# Patient Record
Sex: FEMALE | Race: BLACK OR AFRICAN AMERICAN | NOT HISPANIC OR LATINO | Employment: UNEMPLOYED | URBAN - METROPOLITAN AREA
[De-identification: names, ages, dates, MRNs, and addresses within clinical notes are randomized per-mention and may not be internally consistent; named-entity substitution may affect disease eponyms.]

---

## 2018-09-13 ENCOUNTER — OFFICE VISIT (OUTPATIENT)
Dept: OBSTETRICS AND GYNECOLOGY | Facility: CLINIC | Age: 28
End: 2018-09-13
Payer: MEDICAID

## 2018-09-13 VITALS
SYSTOLIC BLOOD PRESSURE: 112 MMHG | BODY MASS INDEX: 34.63 KG/M2 | WEIGHT: 183.44 LBS | DIASTOLIC BLOOD PRESSURE: 74 MMHG | HEIGHT: 61 IN

## 2018-09-13 DIAGNOSIS — N92.6 MISSED PERIOD: Primary | ICD-10-CM

## 2018-09-13 LAB
B-HCG UR QL: POSITIVE
CTP QC/QA: YES

## 2018-09-13 PROCEDURE — 81025 URINE PREGNANCY TEST: CPT | Mod: PBBFAC | Performed by: OBSTETRICS & GYNECOLOGY

## 2018-09-13 PROCEDURE — 99999 PR PBB SHADOW E&M-NEW PATIENT-LVL III: CPT | Mod: PBBFAC,,, | Performed by: OBSTETRICS & GYNECOLOGY

## 2018-09-13 PROCEDURE — 99203 OFFICE O/P NEW LOW 30 MIN: CPT | Mod: S$PBB,TH,, | Performed by: OBSTETRICS & GYNECOLOGY

## 2018-09-13 PROCEDURE — 99203 OFFICE O/P NEW LOW 30 MIN: CPT | Mod: PBBFAC | Performed by: OBSTETRICS & GYNECOLOGY

## 2018-09-20 ENCOUNTER — LAB VISIT (OUTPATIENT)
Dept: LAB | Facility: HOSPITAL | Age: 28
End: 2018-09-20
Attending: OBSTETRICS & GYNECOLOGY
Payer: MEDICAID

## 2018-09-20 ENCOUNTER — INITIAL PRENATAL (OUTPATIENT)
Dept: OBSTETRICS AND GYNECOLOGY | Facility: CLINIC | Age: 28
End: 2018-09-20
Payer: MEDICAID

## 2018-09-20 VITALS
DIASTOLIC BLOOD PRESSURE: 64 MMHG | BODY MASS INDEX: 35.28 KG/M2 | WEIGHT: 186.69 LBS | SYSTOLIC BLOOD PRESSURE: 110 MMHG

## 2018-09-20 DIAGNOSIS — O09.30 LATE PRENATAL CARE: ICD-10-CM

## 2018-09-20 DIAGNOSIS — Z34.90 CURRENT PREGNANCY WITH UNCERTAIN DATE OF LAST MENSTRUAL PERIOD WITH NORMAL PRIOR PREGNANCY: ICD-10-CM

## 2018-09-20 DIAGNOSIS — Z34.90 ENCOUNTER FOR SUPERVISION OF NORMAL PREGNANCY, ANTEPARTUM, UNSPECIFIED GRAVIDITY: Primary | ICD-10-CM

## 2018-09-20 DIAGNOSIS — Z34.90 ENCOUNTER FOR SUPERVISION OF NORMAL PREGNANCY, ANTEPARTUM, UNSPECIFIED GRAVIDITY: ICD-10-CM

## 2018-09-20 LAB
ABO + RH BLD: NORMAL
BASOPHILS # BLD AUTO: 0 K/UL
BASOPHILS NFR BLD: 0 %
BLD GP AB SCN CELLS X3 SERPL QL: NORMAL
CANDIDA RRNA VAG QL PROBE: NEGATIVE
DIFFERENTIAL METHOD: ABNORMAL
EOSINOPHIL # BLD AUTO: 0 K/UL
EOSINOPHIL NFR BLD: 0.2 %
ERYTHROCYTE [DISTWIDTH] IN BLOOD BY AUTOMATED COUNT: 13.9 %
G VAGINALIS RRNA GENITAL QL PROBE: NEGATIVE
HCT VFR BLD AUTO: 34.7 %
HGB BLD-MCNC: 11.5 G/DL
LYMPHOCYTES # BLD AUTO: 1.8 K/UL
LYMPHOCYTES NFR BLD: 20 %
MCH RBC QN AUTO: 27.6 PG
MCHC RBC AUTO-ENTMCNC: 33.1 G/DL
MCV RBC AUTO: 83 FL
MONOCYTES # BLD AUTO: 0.9 K/UL
MONOCYTES NFR BLD: 10.3 %
NEUTROPHILS # BLD AUTO: 6.1 K/UL
NEUTROPHILS NFR BLD: 69.5 %
PLATELET # BLD AUTO: 265 K/UL
PMV BLD AUTO: 11 FL
RBC # BLD AUTO: 4.17 M/UL
T VAGINALIS RRNA GENITAL QL PROBE: NEGATIVE
WBC # BLD AUTO: 8.84 K/UL

## 2018-09-20 PROCEDURE — 81511 FTL CGEN ABNOR FOUR ANAL: CPT

## 2018-09-20 PROCEDURE — 86762 RUBELLA ANTIBODY: CPT

## 2018-09-20 PROCEDURE — 86592 SYPHILIS TEST NON-TREP QUAL: CPT

## 2018-09-20 PROCEDURE — 86703 HIV-1/HIV-2 1 RESULT ANTBDY: CPT

## 2018-09-20 PROCEDURE — 99214 OFFICE O/P EST MOD 30 MIN: CPT | Mod: TH,S$PBB,25, | Performed by: OBSTETRICS & GYNECOLOGY

## 2018-09-20 PROCEDURE — 88142 CYTOPATH C/V THIN LAYER: CPT | Performed by: PATHOLOGY

## 2018-09-20 PROCEDURE — 88141 CYTOPATH C/V INTERPRET: CPT | Mod: ,,, | Performed by: PATHOLOGY

## 2018-09-20 PROCEDURE — 87660 TRICHOMONAS VAGIN DIR PROBE: CPT

## 2018-09-20 PROCEDURE — 99999 PR PBB SHADOW E&M-EST. PATIENT-LVL III: CPT | Mod: PBBFAC,,, | Performed by: OBSTETRICS & GYNECOLOGY

## 2018-09-20 PROCEDURE — 99213 OFFICE O/P EST LOW 20 MIN: CPT | Mod: PBBFAC,TH,25 | Performed by: OBSTETRICS & GYNECOLOGY

## 2018-09-20 PROCEDURE — 83021 HEMOGLOBIN CHROMOTOGRAPHY: CPT

## 2018-09-20 PROCEDURE — 86850 RBC ANTIBODY SCREEN: CPT

## 2018-09-20 PROCEDURE — 87340 HEPATITIS B SURFACE AG IA: CPT

## 2018-09-20 PROCEDURE — 36415 COLL VENOUS BLD VENIPUNCTURE: CPT

## 2018-09-20 PROCEDURE — 76805 OB US >/= 14 WKS SNGL FETUS: CPT | Mod: 26,S$PBB,, | Performed by: OBSTETRICS & GYNECOLOGY

## 2018-09-20 PROCEDURE — 87086 URINE CULTURE/COLONY COUNT: CPT

## 2018-09-20 PROCEDURE — 85025 COMPLETE CBC W/AUTO DIFF WBC: CPT

## 2018-09-20 PROCEDURE — 76805 OB US >/= 14 WKS SNGL FETUS: CPT | Mod: PBBFAC | Performed by: OBSTETRICS & GYNECOLOGY

## 2018-09-20 PROCEDURE — 87491 CHLMYD TRACH DNA AMP PROBE: CPT

## 2018-09-20 NOTE — PATIENT INSTRUCTIONS
Adapting to Pregnancy: Second Trimester  Keep up the healthy habits you started in your first trimester. You might be a little more tired than normal. So plan your day wisely. Look at the tips below and choose the ones that suit your lifestyle.    If you have any questions, check with your healthcare provider.   If you work  If you can, adjust your work with your employer to fit your needs. Try these tips:  · If you stand for long periods, find ways to do some tasks while sitting. Also, try to stand with 1 foot resting on a low stool or ledge. Shift your weight from foot to foot often. Wear low-heeled shoes.  · If you sit, keep your knees level with your hips. Rest your feet on a firm surface. Sit tall with support for your low back.  · If you work long hours, ask about adjusting your schedule. Try taking shorter breaks more often.  When you travel  The second trimester may be the best time for any travel. Talk to your healthcare provider about any special plans you may need to make. Always:  · Wear a seat belt. Fasten the lap part under your belly. Wear the shoulder part also.  · Take breaks often during long trips by car or plane. Move around to stretch your legs.  · Drink plenty of fluids on flights. The air in plane cabins is very dry.  · Avoid hot climates or high altitudes if you are not used to them.  · Avoid places where the food and water might make you sick.  · Make sure you are up-to-date on all immunizations, including the flu vaccine. This is especially important when traveling overseas.  Taking time to relax  Find time to rest and relax at work or at home:  · Take short time-outs daily. Do relaxation exercises.  · Breathe deeply during stressful times.  · Try not to take on too much. Plan tasks for times when you have the most energy.  · Take naps when you can. Or just sit and relax.  · After week 16, avoid lying on your back for more than a few minutes. Instead, lie on your side. Switch sides  often.  Continuing as lovers  Unless your healthcare provider tells you otherwise, there is no reason to stop having sex now. Blood supply increases to the pelvic area in the second trimester. Because of this, sex might be more enjoyable. Try different positions and see whats best. Also, talk to your partner about any changes in desire. Spotting may happen after sex. Be sure to let your healthcare provider know if there is heavy bleeding.  Keeping your environment safe  You can still clean house and use scented products. Just take some simple precautions:  · Wear gloves when using cleaning fluids.  · Open windows to let in fresh air. Use a fan if you paint.  · Avoid secondhand smoke.  · Dont breathe fumes from nail polish, hair spray, cleansers, or other chemicals.  Date Last Reviewed: 8/16/2015  © 6556-5966 [email or web address] [address] [place] [address], [place] [address]. All rights reserved. This information is not intended as a substitute for professional medical care. Always follow your healthcare professional's instructions.

## 2018-09-20 NOTE — PROGRESS NOTES
Complaints today: Ms. Pendleton denies complaints this visit. She reports that she is not experiencing nausea or vomiting. She is able to tolerate a regular diet. She has notices an increase in normal discharge but not foul odor or changes to discharge. Patient reports that she was told by the [facility] office that she was approximately 15weeks but by the LMP she has given of 2018 which she is unsure about her SHARON is 2019 and she would be 12w0d today    /64   Wt 84.7 kg (186 lb 11.2 oz)   LMP 2018   BMI 35.28 kg/m²     Patient Active Problem List   Diagnosis    Encounter for supervision of normal pregnancy, antepartum    Late prenatal care     OB History    Para Term  AB Living   4 3 3     3   SAB TAB Ectopic Multiple Live Births           1      # Outcome Date GA Lbr Jules/2nd Weight Sex Delivery Anes PTL Lv   4 Current            3 Term 02/15/15 40w0d   F Vag-Spont  N    2 Term 12 40w0d   F Vag-Spont  N JULY   1 Term 11/01/10 37w0d   M Vag-Spont             Dating reviewed: Unofficial Abdominal US done with SHARON 2019 but will not change dating until official [facility] US is done    Allergies and problem list reviewed and updated    Medical and surgical history reviewed    Prenatal labs drawn today    Review of Systems - General ROS: negative for - chills, fever or sleep disturbance  Psychological ROS: negative for - anxiety or depression  Breast ROS: negative for - nipple discharge or breast lumps  Respiratory ROS: no cough, shortness of breath, or wheezing  Cardiovascular ROS: no chest pain or dyspnea on exertion  Gastrointestinal ROS: no abdominal pain, change in bowel habits, or black or bloody stools  Genito-Urinary ROS: no dysuria, trouble voiding, or hematuria  Musculoskeletal ROS: negative for - muscle pain or muscular weakness      Physical Examination: General appearance - alert, well appearing, and in no distress and oriented to person, place, and time  Chest - clear  to auscultation, no wheezes, rales or rhonchi, symmetric air entry  Heart - normal rate, regular rhythm, normal S1, S2, no murmurs, rubs, clicks or gallops  Abdomen - soft, non-tender but gravid  Pelvic - Cervix normal and visually closed, no adnexa fullness, uterus approximately 15 week size  Extremities - peripheral pulses normal, no pedal edema, no clubbing or cyanosis  Skin - normal coloration and turgor, no rashes, no suspicious skin lesions noted        Assessment/Plan:  Anila was seen today for routine prenatal visit.    Diagnoses and all orders for this visit:    Encounter for supervision of normal pregnancy, antepartum, unspecified   -     HIV-1 and HIV-2 antibodies; Future  -     RPR; Future  -     Hemoglobin Electrophoresis,Hgb A2 Blake.; Future  -     Liquid-based pap smear, screening  -     Hepatitis B surface antigen; Future  -     Type & Screen - Ob Profile; Future  -     Rubella antibody, IgG; Future  -     Urine culture  -     C. trachomatis/N. gonorrhoeae by AMP DNA  -     Vaginosis Screen by DNA Probe  -     Cancel: US MFM Procedure ([facility]); Future  -     CBC auto differential; Future  -     US OB/GYN Procedure ([facility])  -     Maternal Quad Screen; Future  -     US MFM Procedure ([facility]); Future    Late prenatal care    Current pregnancy with uncertain date of last menstrual period with normal prior pregnancy  - SHARON by LMP given 2019  - On first trimester dating scan, fetus clearly larger than first trimester  - Quick measurements revealed SHARON 2019 AND 16w4d  - Will have MFM US done to have official dating      Orders Placed This Encounter   Procedures    Urine culture    C. trachomatis/N. gonorrhoeae by AMP DNA    Vaginosis Screen by DNA Probe    HIV-1 and HIV-2 antibodies    RPR    Hemoglobin Electrophoresis,Hgb A2 Blake.    Hepatitis B surface antigen    Rubella antibody, IgG    CBC auto differential    Maternal Quad Screen    Type & Screen - Ob Profile     US OB/GYN Procedure ([facility])    US MFM Procedure ([facility])       Follow-up in about 4 weeks (around 10/18/2018) for routine OB.

## 2018-09-21 ENCOUNTER — TELEPHONE (OUTPATIENT)
Dept: OBSTETRICS AND GYNECOLOGY | Facility: CLINIC | Age: 28
End: 2018-09-21

## 2018-09-21 LAB
C TRACH DNA SPEC QL NAA+PROBE: NOT DETECTED
HBV SURFACE AG SERPL QL IA: NEGATIVE
HGB A2 MFR BLD HPLC: 2.9 %
HGB FRACT BLD ELPH-IMP: NORMAL
HGB FRACT BLD ELPH-IMP: NORMAL
HIV 1+2 AB+HIV1 P24 AG SERPL QL IA: NEGATIVE
N GONORRHOEA DNA SPEC QL NAA+PROBE: NOT DETECTED
RPR SER QL: NORMAL
RUBV IGG SER-ACNC: 88.3 IU/ML
RUBV IGG SER-IMP: REACTIVE

## 2018-09-21 NOTE — TELEPHONE ENCOUNTER
----- Message from Tanya Nichols sent at 9/21/2018  9:05 AM CDT -----  Contact: [facility] The prenatal testing area of [facility] called to obtain a due date for the patient. They can be reached at [phone]. Please call at your earliest convenience.   -----------------------------------------------------  9/21/18 @ 1142 (DRE)   SPOKE WITH KEYUR @ [facility]  SHE REQUESTED THE PT'S DUE DATE OF 4/4/19 FOR PT'S LABS

## 2018-09-22 LAB — BACTERIA UR CULT: NORMAL

## 2018-10-05 ENCOUNTER — PROCEDURE VISIT (OUTPATIENT)
Dept: OBSTETRICS AND GYNECOLOGY | Facility: CLINIC | Age: 28
End: 2018-10-05
Payer: MEDICAID

## 2018-10-05 VITALS
BODY MASS INDEX: 35.4 KG/M2 | WEIGHT: 187.5 LBS | SYSTOLIC BLOOD PRESSURE: 108 MMHG | HEIGHT: 61 IN | DIASTOLIC BLOOD PRESSURE: 70 MMHG

## 2018-10-05 DIAGNOSIS — R87.613 HSIL (HIGH GRADE SQUAMOUS INTRAEPITHELIAL LESION) ON PAP SMEAR OF CERVIX: Primary | ICD-10-CM

## 2018-10-05 PROCEDURE — 88305 TISSUE EXAM BY PATHOLOGIST: CPT | Performed by: PATHOLOGY

## 2018-10-05 PROCEDURE — 88305 TISSUE EXAM BY PATHOLOGIST: CPT | Mod: 26,,, | Performed by: PATHOLOGY

## 2018-10-05 PROCEDURE — 57455 BIOPSY OF CERVIX W/SCOPE: CPT | Mod: PBBFAC | Performed by: OBSTETRICS & GYNECOLOGY

## 2018-10-05 PROCEDURE — 57455 BIOPSY OF CERVIX W/SCOPE: CPT | Mod: S$PBB,,, | Performed by: OBSTETRICS & GYNECOLOGY

## 2018-10-05 NOTE — PROCEDURES
Colposcopy  Date/Time: 10/5/2018 3:10 PM  Performed by: Shiva Arenas MD  Authorized by: Shiva Arenas MD     Consent Done?:  Yes (Written)  Assistants?: No      Colposcopy Site:  Cervix  Position:  Supine   Patient was prepped and draped in the normal sterile fashion.  Acrowhite Lesion? Yes    Atypical Vessels: No    Transformation Zone Adequate?: Yes    Biopsy?: Yes         Location:  Cervix ((3 00, 10 00 and 6 00))  ECC Performed?: No    LEEP Performed?: No    Estimated blood loss (cc):  5   Patient tolerated the procedure well with no immediate complications.   Post-operative instructions were provided for the patient.   Patient was discharged and will follow up if any complications occur     COLPOSCOPY:    Anila Pendleton is a 27 y.o. female   presents for colposcopy.  Patient's last menstrual period was 2018. 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      The abnormal test findings were discussed, as well as HPV infection, need for colposcopy and possible biopsies to determine the plan of care, treatments available, the minimal risk of bleeding and infection with colposcopy, and alternatives to colposcopy and she agrees to proceed.      UPT is negative    COLPOSCOPY EXAM:   TIME OUT PERFORMED.     acetowhite lesion(s) noted at 3, 6, and 10 o'clock    Biopsy was taken at 3, 6, and 10 o clock.  ECC was not performed    Hemostasis was adequate with application of Monsel's solution.  The speculum was removed.  The patient did tolerate the procedure well.    All collected specimens sent to pathology for histologic analysis.    Post-colposcopy counseling:  The patient was instructed to manage post-colposcopy cramping with NSAIDs or Tylenol, or with a prescription per the medication card.  Avoid intercourse, douching, or tampons in the vagina for at least 2-3 days.  Expect a clumpy blackish  discharge due to Monsel's solution application for several days.  Report heavy bleeding, worsening pain or pain that does not respond to above medications, or foul-smelling vaginal discharge. HPV vaccine recommended according to FDA age guidelines.  Importance of follow-up stressed.      Follow up based on colposcopy results.

## 2018-10-18 ENCOUNTER — ROUTINE PRENATAL (OUTPATIENT)
Dept: OBSTETRICS AND GYNECOLOGY | Facility: CLINIC | Age: 28
End: 2018-10-18
Payer: MEDICAID

## 2018-10-18 VITALS
DIASTOLIC BLOOD PRESSURE: 74 MMHG | WEIGHT: 189.38 LBS | BODY MASS INDEX: 35.79 KG/M2 | SYSTOLIC BLOOD PRESSURE: 112 MMHG

## 2018-10-18 DIAGNOSIS — O09.30 LATE PRENATAL CARE: Primary | ICD-10-CM

## 2018-10-18 DIAGNOSIS — Z34.90 ENCOUNTER FOR SUPERVISION OF NORMAL PREGNANCY, ANTEPARTUM, UNSPECIFIED GRAVIDITY: ICD-10-CM

## 2018-10-18 DIAGNOSIS — N87.1 DYSPLASIA OF CERVIX, HIGH GRADE CIN 2: ICD-10-CM

## 2018-10-18 PROCEDURE — 99213 OFFICE O/P EST LOW 20 MIN: CPT | Mod: TH,S$PBB,, | Performed by: OBSTETRICS & GYNECOLOGY

## 2018-10-18 PROCEDURE — 99212 OFFICE O/P EST SF 10 MIN: CPT | Mod: PBBFAC,TH | Performed by: OBSTETRICS & GYNECOLOGY

## 2018-10-18 PROCEDURE — 99999 PR PBB SHADOW E&M-EST. PATIENT-LVL II: CPT | Mod: PBBFAC,,, | Performed by: OBSTETRICS & GYNECOLOGY

## 2018-10-18 NOTE — PROGRESS NOTES
Complaints today: Helder  is doing well with no complaints. Normal fetal movements with no vaginal bleeding, LOF or contractions at this time.       Wt 85.9 kg (189 lb 6.4 oz)   LMP 2018   BMI 35.79 kg/m²     27 y.o., at 16w0d by Estimated Date of Delivery: 19  Patient Active Problem List   Diagnosis    Encounter for supervision of normal pregnancy, antepartum    Late prenatal care    HSIL (high grade squamous intraepithelial lesion) on Pap smear of cervix     OB History    Para Term  AB Living   4 3 3     3   SAB TAB Ectopic Multiple Live Births           1      # Outcome Date GA Lbr Jules/2nd Weight Sex Delivery Anes PTL Lv   4 Current            3 Term 02/15/15 40w0d   F Vag-Spont  N    2 Term 12 40w0d   F Vag-Spont  N JULY   1 Term 11/01/10 37w0d   M Vag-Spont             Dating reviewed    Allergies and problem list reviewed and updated    Medical and surgical history reviewed    Prenatal labs reviewed and updated    Physical Exam:  ABD: soft, gravid, nontender, 18 week size gravid uterus    Assessment:   at approimately 16w0d by approximate LMP doing well    Plan:   1. MFM Dating US and Anatomy scan 10/22g  2. S/p colposcopy with one CIN2 lesion; will repeat pap smear 6 weeks postpartum  3. Abnormal Quad screen; likely due to incorrect dating  4. Follow up 4 Weeks, bleeding/pain precautions given

## 2018-10-18 NOTE — PATIENT INSTRUCTIONS
Adapting to Pregnancy: Second Trimester  Keep up the healthy habits you started in your first trimester. You might be a little more tired than normal. So plan your day wisely. Look at the tips below and choose the ones that suit your lifestyle.    If you have any questions, check with your healthcare provider.   If you work  If you can, adjust your work with your employer to fit your needs. Try these tips:  · If you stand for long periods, find ways to do some tasks while sitting. Also, try to stand with 1 foot resting on a low stool or ledge. Shift your weight from foot to foot often. Wear low-heeled shoes.  · If you sit, keep your knees level with your hips. Rest your feet on a firm surface. Sit tall with support for your low back.  · If you work long hours, ask about adjusting your schedule. Try taking shorter breaks more often.  When you travel  The second trimester may be the best time for any travel. Talk to your healthcare provider about any special plans you may need to make. Always:  · Wear a seat belt. Fasten the lap part under your belly. Wear the shoulder part also.  · Take breaks often during long trips by car or plane. Move around to stretch your legs.  · Drink plenty of fluids on flights. The air in plane cabins is very dry.  · Avoid hot climates or high altitudes if you are not used to them.  · Avoid places where the food and water might make you sick.  · Make sure you are up-to-date on all immunizations, including the flu vaccine. This is especially important when traveling overseas.  Taking time to relax  Find time to rest and relax at work or at home:  · Take short time-outs daily. Do relaxation exercises.  · Breathe deeply during stressful times.  · Try not to take on too much. Plan tasks for times when you have the most energy.  · Take naps when you can. Or just sit and relax.  · After week 16, avoid lying on your back for more than a few minutes. Instead, lie on your side. Switch sides  often.  Continuing as lovers  Unless your healthcare provider tells you otherwise, there is no reason to stop having sex now. Blood supply increases to the pelvic area in the second trimester. Because of this, sex might be more enjoyable. Try different positions and see whats best. Also, talk to your partner about any changes in desire. Spotting may happen after sex. Be sure to let your healthcare provider know if there is heavy bleeding.  Keeping your environment safe  You can still clean house and use scented products. Just take some simple precautions:  · Wear gloves when using cleaning fluids.  · Open windows to let in fresh air. Use a fan if you paint.  · Avoid secondhand smoke.  · Dont breathe fumes from nail polish, hair spray, cleansers, or other chemicals.  Date Last Reviewed: 8/16/2015  © 9110-4283 [employer]. [address] [place] [address], [place] [address]. All rights reserved. This information is not intended as a substitute for professional medical care. Always follow your healthcare professional's instructions.

## 2018-10-20 ENCOUNTER — HOSPITAL ENCOUNTER (EMERGENCY)
Facility: HOSPITAL | Age: 28
Discharge: HOME | End: 2018-10-20
Attending: EMERGENCY MEDICINE
Payer: MEDICAID

## 2018-10-20 VITALS
TEMPERATURE: 98 F | SYSTOLIC BLOOD PRESSURE: 113 MMHG | OXYGEN SATURATION: 99 % | RESPIRATION RATE: 18 BRPM | BODY MASS INDEX: 35.9 KG/M2 | HEART RATE: 90 BPM | WEIGHT: 190 LBS | DIASTOLIC BLOOD PRESSURE: 63 MMHG

## 2018-10-20 DIAGNOSIS — L03.90 CELLULITIS, UNSPECIFIED CELLULITIS SITE: ICD-10-CM

## 2018-10-20 DIAGNOSIS — L02.91 ABSCESS: Primary | ICD-10-CM

## 2018-10-20 LAB
B-HCG UR QL: POSITIVE
CTP QC/QA: YES

## 2018-10-20 PROCEDURE — 25000003 PHARM REV CODE 250: Performed by: EMERGENCY MEDICINE

## 2018-10-20 PROCEDURE — 10060 I&D ABSCESS SIMPLE/SINGLE: CPT

## 2018-10-20 PROCEDURE — 81025 URINE PREGNANCY TEST: CPT | Performed by: EMERGENCY MEDICINE

## 2018-10-20 PROCEDURE — 99284 EMERGENCY DEPT VISIT MOD MDM: CPT | Mod: 25

## 2018-10-20 RX ORDER — MUPIROCIN 20 MG/G
OINTMENT TOPICAL 3 TIMES DAILY
Qty: 1 TUBE | Refills: 0 | Status: SHIPPED | OUTPATIENT
Start: 2018-10-20 | End: 2018-10-30

## 2018-10-20 RX ORDER — MUPIROCIN 20 MG/G
OINTMENT TOPICAL
Status: COMPLETED | OUTPATIENT
Start: 2018-10-20 | End: 2018-10-20

## 2018-10-20 RX ORDER — ACETAMINOPHEN 500 MG
1000 TABLET ORAL EVERY 6 HOURS PRN
Qty: 30 TABLET | Refills: 0 | Status: SHIPPED | OUTPATIENT
Start: 2018-10-20 | End: 2018-11-15

## 2018-10-20 RX ORDER — CLINDAMYCIN HYDROCHLORIDE 150 MG/1
300 CAPSULE ORAL 4 TIMES DAILY
Qty: 80 CAPSULE | Refills: 0 | Status: SHIPPED | OUTPATIENT
Start: 2018-10-20 | End: 2018-10-30

## 2018-10-20 RX ADMIN — MUPIROCIN: 20 OINTMENT TOPICAL at 01:10

## 2018-10-20 NOTE — ED PROVIDER NOTES
"Encounter Date: 10/20/2018    SCRIBE #1 NOTE: I, Mckenna Pendleton, am scribing for, and in the presence of,  Dr. Vasquez. I have scribed the following portions of the note - Other sections scribed: HPI, ROS, PE.       History     Chief Complaint   Patient presents with    Rash     Pt states," I have a rash on my left ankle for a few days and it itches. It has my ankle swollen."     27 y.o female who is 20 weeks gestation presents with a rash on her left ankle for 1 week that has worsened today. She notes swelling today. She also says her bed sheets irritate the area. She is unsure how she got the rash, but she says "I scratched my ankle and had bumps ever since". She thinks the cause of the rash is from a mosquito bite.       The history is provided by the patient.     Review of patient's allergies indicates:  No Known Allergies  Past Medical History:   Diagnosis Date    Anemia      Past Surgical History:   Procedure Laterality Date    COLPOSCOPY       Family History   Problem Relation Age of Onset    Cancer Maternal Grandmother     Diabetes Maternal Uncle      Social History     Tobacco Use    Smoking status: Never Smoker    Smokeless tobacco: Never Used   Substance Use Topics    Alcohol use: No     Frequency: Never    Drug use: No     Review of Systems   Constitutional: Negative for chills and fever.   Respiratory: Negative for shortness of breath.    Cardiovascular: Negative for chest pain.   Gastrointestinal: Negative for abdominal pain.   Genitourinary: Negative for vaginal bleeding.   Skin: Positive for color change and rash. Negative for wound.   All other systems reviewed and are negative.      Physical Exam     Initial Vitals [10/20/18 1243]   BP Pulse Resp Temp SpO2   108/75 100 16 98.1 °F (36.7 °C) 99 %      MAP       --         Patient gave consent to have physical exam performed.  Physical Exam    Nursing note and vitals reviewed.  Constitutional: She appears well-developed and well-nourished. She " is not diaphoretic. No distress.   HENT:   Head: Normocephalic and atraumatic.   Right Ear: External ear normal.   Left Ear: External ear normal.   Nose: Nose normal.   Eyes: Conjunctivae and EOM are normal. Pupils are equal, round, and reactive to light.   Neck: Normal range of motion. Neck supple.   Cardiovascular: Normal rate, regular rhythm and normal heart sounds. Exam reveals no gallop and no friction rub.    No murmur heard.  Pulmonary/Chest: Breath sounds normal. No respiratory distress. She has no wheezes. She has no rhonchi. She has no rales. She exhibits no tenderness.   Abdominal: Soft. Bowel sounds are normal. She exhibits no distension and no mass. There is no tenderness. There is no rebound.   Musculoskeletal: Normal range of motion.   Neurological: She is alert and oriented to person, place, and time. No cranial nerve deficit or sensory deficit.   Skin: Skin is warm, dry and intact. Capillary refill takes less than 2 seconds. Lesion and abscess noted. There is erythema.              ED Course   I & D - Incision and Drainage  Date/Time: 10/20/2018 2:27 PM  Performed by: Zoey Vasquez DO  Authorized by: Zoey Vasquez DO   Consent Done: Yes  Consent: Verbal consent obtained.  Risks and benefits: risks, benefits and alternatives were discussed  Consent given by: patient  Patient understanding: patient states understanding of the procedure being performed  Patient consent: the patient's understanding of the procedure matches consent given  Procedure consent: procedure consent matches procedure scheduled  Type: abscess  Anesthesia: local infiltration    Anesthesia:  Local Anesthetic: lidocaine 2% without epinephrine  Anesthetic total: 2 mL  Patient sedated: no  Scalpel size: 11  Incision type: single straight  Complexity: simple  Drainage: pus  Drainage amount: scant  Wound treatment: incision and  drainage  Packing material: none  Complications: No  Specimens: No  Implants: No  Patient tolerance: Patient  tolerated the procedure well with no immediate complications  Comments: Superficial area of fluctuance.  I unroofed abscess scant amount pus removed.  Patient tolerated well.  Antibiotic ointment applied and bandage applied.        Labs Reviewed   POCT URINE PREGNANCY - Abnormal; Notable for the following components:       Result Value    POC Preg Test, Ur Positive (*)     All other components within normal limits               Medical Decision Making:   Clinical Tests:   Lab Tests: Ordered and Reviewed    Treatment in the ED Physical Exam, incision and drainage.  Patient reports feeling much better after treatment in the emergency room.    Discussed outpatient treatment plan and follow-up    Fill and take prescriptions as directed.  Return to the ED if symptoms worsen or do not resolve.   Answered questions and discussed discharge plan.    Patient feels much better and is ready for discharge.  Follow up with PCP in 1 days.            Scribe Attestation:   Scribe #1: I performed the above scribed service and the documentation accurately describes the services I performed. I attest to the accuracy of the note.     I, Dr. Zoey Vasquez, personally performed the services described in this documentation. This document was produced by a scribe under my direction and in my presence. All medical record entries made by the scribe were at my direction and in my presence.  I have reviewed the chart and agree that the record reflects my personal performance and is accurate and complete. Zoey Vasquez DO.     10/20/2018 2:29 PM             Clinical Impression:     1. Abscess    2. Cellulitis, unspecified cellulitis site                               Zoey Vasquez DO  10/20/18 5638

## 2018-10-22 ENCOUNTER — HOSPITAL ENCOUNTER (OUTPATIENT)
Dept: PERINATAL CARE | Facility: HOSPITAL | Age: 28
Discharge: HOME | End: 2018-10-22
Attending: OBSTETRICS & GYNECOLOGY
Payer: MEDICAID

## 2018-10-22 DIAGNOSIS — Z34.90 ENCOUNTER FOR SUPERVISION OF NORMAL PREGNANCY, ANTEPARTUM, UNSPECIFIED GRAVIDITY: ICD-10-CM

## 2018-10-22 DIAGNOSIS — Z36.89 ENCOUNTER FOR FETAL ANATOMIC SURVEY: ICD-10-CM

## 2018-10-22 DIAGNOSIS — O28.0 ABNORMAL MATERNAL SERUM SCREENING TEST: Primary | ICD-10-CM

## 2018-10-22 PROCEDURE — 76805 OB US >/= 14 WKS SNGL FETUS: CPT

## 2018-10-22 PROCEDURE — 76805 OB US >/= 14 WKS SNGL FETUS: CPT | Mod: 26,,, | Performed by: OBSTETRICS & GYNECOLOGY

## 2018-10-22 NOTE — ADDENDUM NOTE
Encounter addended by: Yazmin Ocampo RN on: 10/22/2018 10:09 AM   Actions taken: Charge Capture section accepted

## 2018-10-22 NOTE — PROGRESS NOTES
Indication  ========    Fetal anatomy survey.    History  ======    General History  Blood group: O  Rhesus: Rh positive  Smoking: No  Height 155 cm  Height (ft) 5 ft  Height (in) 1 in  Medical History  Allergies: No allergies identified  Past medical history: No previous diseases identified  Past surgical history: No previous surgeries performed  Previous Outcomes  Preg. no. 1  Outcome: Live YOB: 2012  Gest. age 37 w + 0 d  Gender: male  Preg. no. 2  Outcome: Live YOB: 2012  Gest. age 40 w + 0 d  Gender: female  Preg. no. 3  Outcome: Live YOB: 2015  Gest. age 40 w + 0 d  Gender: female   4  Para 3  Ambrocio children born living (T) 3  Ambrocio children born (T) 3  Abortions (A) 0  Ambrocio living children (L) 3    Pregnancy History  ==============    Maternal Lab Tests  Test: quad screen  Result:  Uninterpretable    Wants to know gender: yes    Maternal Assessment  =================    Height 155 cm  Height (ft) 5 ft  Height (in) 1 in  BP syst 119 mmHg  BP diast 64 mmHg    Method  ======    2D Color Doppler, , [place]on E6, Transabdominal ultrasound examination. View: Good view.    Pregnancy  =========    Ambrocio pregnancy. Number of fetuses: 1.    Dating  ======    LMP on: 2018  Cycle: regular cycle  GA by LMP 16 w + 4 d  SHARON by LMP: 2019  Ultrasound examination on: 10/22/2018  GA by U/S based upon: AC, BPD, Femur, HC  GA by U/S 20 w + 6 d  SHARON by U/S: 3/5/2019  Assigned: Dating performed on 10/22/2018, based on ultrasound (AC, BPD, Femur, HC)  Assigned GA 20 w + 6 d  Assigned SHARON: 3/5/2019    General Evaluation  ==============    Cardiac activity: present.  bpm.  Fetal movements: visualized.  Presentation: cephalic.  Placenta: posterior, fundal.  Umbilical cord: placental insertion: normal, 3 vessel cord, normal.  Amniotic fluid: normal amount.    Fetal Biometry  ============    Fetal Biometry  BPD 47.5 mm 20w 3d Hadlock  OFD 64.4 mm 21w 6d  Kadie  .6 mm 20w 4d Hadlock  .3 mm 21w 6d Hadlock  Femur 33.6 mm 20w 4d Hadlock  Cerebellum tr 5.4 mm  CM 20.8 mm  Humerus 32.2 mm 20w 6d Kadie   g 39% Juan C  Calculated by: Hadlock (BPD-HC-AC-FL)  EFW (lb) 0 lb  EFW (oz) 14 oz  Cephalic index 0.74  HC / AC 1.08  FL / BPD 0.71  FL / AC 0.20   bpm  Head / Face / Neck   5.3 mm    Fetal Anatomy  ============    Cranium: normal  Lateral ventricles: normal  Choroid plexus: normal  Midline falx: normal  Cavum septi pellucidi: normal  Cerebellum: normal  Cisterna magna: normal  Head shape: normal  Rt lateral ventricle: normal  Lt lateral ventricle: normal  Rt choroid plexus: normal  Lt choroid plexus: normal  Parenchyma: normal  Third ventricle: normal  Posterior fossa: normal  Cerebellar lobes: normal  Vermis: normal  Neck: appears normal  Nuchal fold: normal  Lips: normal  Profile: normal  Nose: normal  Maxilla: normal  Mandible: normal  4-chamber view: normal  RVOT: normal  LVOT: normal  Heart / Thorax: Septal views: normal  Situs: normal  Aortic arch: normal  Ductal arch: normal  SVC: normal  IVC: normal  3-vessel view: normal  3-vessel-trachea view: normal  Cardiac position: normal  Cardiac axis: normal  Cardiac size: normal  Cardiac rhythm: normal  Rt lung: normal  Lt lung: normal  Diaphragm: normal  Cord insertion: normal  Stomach: normal  Kidneys: normal  Bladder: normal  Abdom. wall: appears normal  Abdom. cavity: normal  Rt kidney: normal  Lt kidney: normal  Liver: normal  Bowel: normal  Cervical spine: normal  Thoracic spine: normal  Lumbar spine: normal  Sacral spine: normal  Arms: normal  Legs: normal  Rt arm: normal  Lt arm: normal  Rt hand: present  Lt hand: present  Rt leg: normal  Lt leg: normal  Rt foot: normal  Lt foot: normal  Position of hands: normal  Position of feet: normal  Gender: female  Wants to know gender: yes    Maternal Structures  ===============    Uterus / Cervix  Uterus: Normal  Cervical length 39.7  mm  Ovaries / Tubes / Adnexa  Rt ovary: Visualized  Lt ovary: Visualized  Other: Uterus and adnexa normal    Impression  =========    Ambrocio live intrauterine pregnancy.  Based on today's dating, the patient is 20 and 6 weeks with SHARON of 3/5/19.  Normal amniotic fluid volume by qualitative assessment.  The visualized fetal anatomy appears normal.  Placenta is posterior, fundal without previa.  Transabdominal cervical length is normal.    Patient for ultrasound only. Patient informed of ultrasound findings.    Recommendation  ==============    Follow up ultrasound as clinically indicated.  Patient for ultrasound only. I spoke to Dr. Arenas regarding SHARON. She is ordering quad screen for the patient as first was  uninterpretable due to need for SHARON. Patient advised to go to lab per primary ob request. Aware of time limitations of this study.    (patient had ultrasound done 9/20/18 by primary ob which did not agree with LMP dating but primary desires to use our ultrasound for dating)

## 2018-10-31 ENCOUNTER — HOSPITAL ENCOUNTER (EMERGENCY)
Facility: HOSPITAL | Age: 28
Discharge: HOME | End: 2018-10-31
Attending: EMERGENCY MEDICINE
Payer: MEDICAID

## 2018-10-31 VITALS
BODY MASS INDEX: 35.87 KG/M2 | DIASTOLIC BLOOD PRESSURE: 70 MMHG | TEMPERATURE: 98 F | HEART RATE: 89 BPM | HEIGHT: 61 IN | WEIGHT: 190 LBS | OXYGEN SATURATION: 100 % | RESPIRATION RATE: 16 BRPM | SYSTOLIC BLOOD PRESSURE: 110 MMHG

## 2018-10-31 DIAGNOSIS — K02.9 PAIN DUE TO DENTAL CARIES: Primary | ICD-10-CM

## 2018-10-31 LAB
BILIRUBIN, POC UA: NEGATIVE
BLOOD, POC UA: NEGATIVE
CLARITY, POC UA: ABNORMAL
COLOR, POC UA: YELLOW
GLUCOSE, POC UA: NEGATIVE
KETONES, POC UA: NEGATIVE
LEUKOCYTE EST, POC UA: NEGATIVE
NITRITE, POC UA: NEGATIVE
PH UR STRIP: 6 [PH]
PROTEIN, POC UA: NEGATIVE
SPECIFIC GRAVITY, POC UA: >=1.03
UROBILINOGEN, POC UA: 0.2 E.U./DL

## 2018-10-31 PROCEDURE — 99282 EMERGENCY DEPT VISIT SF MDM: CPT

## 2018-10-31 PROCEDURE — 81003 URINALYSIS AUTO W/O SCOPE: CPT

## 2018-10-31 RX ORDER — CHLORHEXIDINE GLUCONATE ORAL RINSE 1.2 MG/ML
15 SOLUTION DENTAL 2 TIMES DAILY
Qty: 118 ML | Refills: 0 | Status: SHIPPED | OUTPATIENT
Start: 2018-10-31 | End: 2018-11-15

## 2018-10-31 NOTE — ED PROVIDER NOTES
Encounter Date: 10/31/2018       History     Chief Complaint   Patient presents with    Dental Pain     PT c/o left upper dental pain x one week. Pt reports she cracked her tooth. Pt currently on Clindamycin for infection to foot. Pt reports she has not been to a dentist because she didn't think they could do anything while she is pregnant. Pt is approx. 22 weeks gestation. Denies any problems with pregnancy. Resp even and non labored. NAD noted.      27 y.o. Female  @ 17 WGA by LMP 18  Patient states she has been taking Clindamycin and ibuprofen and has also been using Orajel for symptoms.        Dental Pain   The primary symptoms include dental injury. Primary symptoms do not include fever. Illness onset: 1 week. The symptoms are unchanged. The symptoms are new. The symptoms occur frequently.   The injury is a fracture.     Review of patient's allergies indicates:  No Known Allergies  Past Medical History:   Diagnosis Date    Anemia      Past Surgical History:   Procedure Laterality Date    COLPOSCOPY       Family History   Problem Relation Age of Onset    Cancer Maternal Grandmother     Diabetes Maternal Uncle      Social History     Tobacco Use    Smoking status: Never Smoker    Smokeless tobacco: Never Used   Substance Use Topics    Alcohol use: No     Frequency: Never    Drug use: No     Review of Systems   Constitutional: Negative for fever.   HENT: Positive for dental problem.    Genitourinary: Negative for dysuria, frequency, vaginal bleeding and vaginal discharge.   All other systems reviewed and are negative.      Physical Exam     Initial Vitals [10/31/18 0055]   BP Pulse Resp Temp SpO2   116/73 92 16 97.7 °F (36.5 °C) 99 %      MAP       --         Physical Exam    Nursing note and vitals reviewed.  Constitutional: She appears well-developed and well-nourished. She is not diaphoretic. No distress.   HENT:   Head: Normocephalic and atraumatic.   Mouth/Throat: Uvula is midline,  oropharynx is clear and moist and mucous membranes are normal. No trismus in the jaw. No dental abscesses or uvula swelling.       There is no gingival swelling, exudate, sublingual swelling, submandibular swelling, stridor, drooling, or trismus     Eyes: Conjunctivae are normal. Pupils are equal, round, and reactive to light.   Neck: Normal range of motion and phonation normal. Neck supple. No stridor present.   Cardiovascular: Normal rate and intact distal pulses.   Pulmonary/Chest: No accessory muscle usage. No tachypnea. No respiratory distress.   Abdominal: Soft. There is no tenderness.   Gravid abdomen   Musculoskeletal: Normal range of motion. She exhibits no tenderness.   Neurological: She is alert and oriented to person, place, and time. She has normal strength. Gait normal. GCS eye subscore is 4. GCS verbal subscore is 5. GCS motor subscore is 6.   Skin: Skin is warm. Capillary refill takes less than 2 seconds.   Psychiatric: She has a normal mood and affect.         ED Course   Procedures  Labs Reviewed   POCT URINALYSIS W/O SCOPE - Abnormal; Notable for the following components:       Result Value    Glucose, UA Negative (*)     Bilirubin, UA Negative (*)     Ketones, UA Negative (*)     Spec Grav UA >=1.030 (*)     Blood, UA Negative (*)     Protein, UA Negative (*)     Nitrite, UA Negative (*)     Leukocytes, UA Negative (*)     All other components within normal limits   POCT URINALYSIS W/O SCOPE          Imaging Results    None                             Discharge Medications        Medication List      START taking these medications    benzocaine 20 % Gel  Commonly known as:  HURRICAINE  Use as directed 1 application in the mouth or throat 4 (four) times daily as needed (dental pain).     chlorhexidine 0.12 % solution  Commonly known as:  PERIDEX  Use as directed 15 mLs in the mouth or throat 2 (two) times daily. Swish and spit for 14 days        ASK your doctor about these medications     acetaminophen 500 MG tablet  Commonly known as:  TYLENOL  Take 2 tablets (1,000 mg total) by mouth every 6 (six) hours as needed for Pain (and fever).     clindamycin 150 MG capsule  Commonly known as:  CLEOCIN  Take 2 capsules (300 mg total) by mouth 4 (four) times daily. for 10 days  Ask about: Should I take this medication?     mupirocin 2 % ointment  Commonly known as:  BACTROBAN  Apply topically 3 (three) times daily. for 10 days  Ask about: Should I take this medication?     PNV,calcium 72-iron-folic acid 27 mg iron- 1 mg Tab  Commonly known as:  PRENATAL VITAMIN PLUS LOW IRON  Take 1 tablet (1 each total) by mouth once daily.           Where to Get Your Medications      You can get these medications from any pharmacy    Bring a paper prescription for each of these medications  · chlorhexidine 0.12 % solution  You don't need a prescription for these medications  · benzocaine 20 % Gel               Patient discharged to home in stable condition with instructions to:   1. Please take all meds as prescribed.  2. Follow-up with your primary care doctor   3. Return precautions discussed and patient and/or family/caretaker understands to return to the emergency room for any concerns including worsening of your current symptoms, fever, chills, night sweats, worsening pain, chest pain, shortness of breath, nausea, vomiting, diarrhea, bleeding, headache, difficulty talking, visual disturbances, weakness, numbness or any other acute concerns       Clinical Impression:   The encounter diagnosis was Pain due to dental caries.                             Princess Ruiz MD  11/09/18 0133

## 2018-11-09 LAB
# FETUSES US: NORMAL
2ND TRIMESTER 4 SCREEN PNL SERPL: NEGATIVE
2ND TRIMESTER 4 SCREEN SERPL-IMP: NORMAL
AFP MOM SERPL: 0.54
AFP SERPL-MCNC: 17.3 NG/ML
AGE AT DELIVERY: 28
B-HCG MOM SERPL: 0.3
B-HCG SERPL-ACNC: 10.7 IU/ML
FET TS 21 RISK FROM MAT AGE: NORMAL
GA (DAYS): 0 D
GA (WEEKS): 16 WK
GA METHOD: NORMAL
IDDM PATIENT QL: NORMAL
INHIBIN A MOM SERPL: 0.61
INHIBIN A SERPL-MCNC: 96.4 PG/ML
SMOKING STATUS FTND: NO
TS 18 RISK FETUS: NORMAL
TS 21 RISK FETUS: NORMAL
U ESTRIOL MOM SERPL: 1.37
U ESTRIOL SERPL-MCNC: 1 NG/ML

## 2018-11-15 ENCOUNTER — ROUTINE PRENATAL (OUTPATIENT)
Dept: OBSTETRICS AND GYNECOLOGY | Facility: CLINIC | Age: 28
End: 2018-11-15
Payer: MEDICAID

## 2018-11-15 VITALS
BODY MASS INDEX: 36.39 KG/M2 | WEIGHT: 192.56 LBS | DIASTOLIC BLOOD PRESSURE: 60 MMHG | SYSTOLIC BLOOD PRESSURE: 102 MMHG

## 2018-11-15 DIAGNOSIS — Z34.82 ENCOUNTER FOR SUPERVISION OF OTHER NORMAL PREGNANCY IN SECOND TRIMESTER: Primary | ICD-10-CM

## 2018-11-15 DIAGNOSIS — O09.30 LATE PRENATAL CARE: ICD-10-CM

## 2018-11-15 PROCEDURE — 99213 OFFICE O/P EST LOW 20 MIN: CPT | Mod: PBBFAC,TH | Performed by: OBSTETRICS & GYNECOLOGY

## 2018-11-15 PROCEDURE — 99999 PR PBB SHADOW E&M-EST. PATIENT-LVL III: CPT | Mod: PBBFAC,,, | Performed by: OBSTETRICS & GYNECOLOGY

## 2018-11-15 PROCEDURE — 99214 OFFICE O/P EST MOD 30 MIN: CPT | Mod: TH,S$PBB,, | Performed by: OBSTETRICS & GYNECOLOGY

## 2018-11-15 NOTE — PROGRESS NOTES
Complaints today:Ms. Stevenson is doing well. She only c/o back pain which she reports is normal in pregnancy for her. She report normal fetal movements with no vaginal bleeding, LOF or contractions at this time.       /60   Wt 87.4 kg (192 lb 9.2 oz)   LMP 2018   BMI 36.39 kg/m²     27 y.o., at 24w2d by Estimated Date of Delivery: 3/5/19  Patient Active Problem List   Diagnosis    Encounter for supervision of normal pregnancy, antepartum    Late prenatal care    HSIL (high grade squamous intraepithelial lesion) on Pap smear of cervix    Dysplasia of cervix, high grade HELENE 2    Encounter for fetal anatomic survey     OB History    Para Term  AB Living   4 3 3     3   SAB TAB Ectopic Multiple Live Births           1      # Outcome Date GA Lbr Jules/2nd Weight Sex Delivery Anes PTL Lv   4 Current            3 Term 02/15/15 40w0d   F Vag-Spont  N    2 Term 12 40w0d   F Vag-Spont  N JULY   1 Term 11/01/10 37w0d   M Vag-Spont             Dating reviewed    Allergies and problem list reviewed and updated    Medical and surgical history reviewed    Prenatal labs reviewed and updated    Physical Exam:  ABD: soft, gravid, nontender, 25 cm    Assessment:  Ms. Pendleton is 24w2d today doing well    Plan:    Anila was seen today for routine prenatal visit.    Diagnoses and all orders for this visit:    Encounter for supervision of other normal pregnancy in second trimester  - Influenza, Tdap, OB glucola to be done at next visit  - Anatomy scan done  - Colposcopy done and discussed: LSIL  - Discussed breastfeeding and Maternity belt at this visit  - Reinforced healthy eating and proper weight gain at this visit   - Discussed negative Quad screen results    Late prenatal care  - Dating updated        No orders of the defined types were placed in this encounter.      Follow-up in about 4 weeks (around 2018) for routine OB.

## 2018-11-15 NOTE — PATIENT INSTRUCTIONS
Adaptación al embarazo: El luanne trimestre  Siga practicando los hábitos saludables que adoptó nestor hernández primer trimestre. Ya que podría sentirse un poco más cansada de lo normal, planifique hernández día con tri. Inga los siguientes consejos y elija los que mejor se adaptan a hernández estilo de bassem.  Si tiene dudas, consulte a hernández proveedor de atención médica.     Si usted trabaja  Si puede, hable con hernández jefe para adaptar hernández trabajo a amy necesidades. He aquí algunos consejos:  · Si pasa mucho tiempo de pie, encuentre maneras para hacer algunas tareas sentada. Cuando esté blackman, trate de apoyar un pie en un banquito y desplace a menudo hernández peso de joel pierna a la otra. Use zapatos de tacón bajo.  · Si trabaja sentada, mantenga las rodillas a la altura de las caderas. Apoye los pies en joel superficie firme y siéntese derecha, apoyando la yonis lumbar de la espalda.  · Si trabaja muchas horas, averigüe si es posible cambiar hernández horario. Pruebe tomando recreos más cortos con más frecuencia.  Cuando viaje  El luanne trimestre puede ser el mejor momento para hacer cualquier tipo de viajes. Hable con hernández proveedor de atención médica sobre algún plan especial que usted podría necesitar. En todo momento:  · Use un cinturón de seguridad. Abróchese la parte horizontal debajo del vientre, y póngase también la parte diagonal del cinturón.  · Nestor los viajes largos en automóvil o avión, tome descansos frecuentes y pasee de un lado a otro para estirar las piernas.  · [place] mucho líquido nestor los vuelos en avión; el aire de las cabinas es muy seco.  · Evite los climas cálidos o las grandes altitudes si no está acostumbrada a estas condiciones.  · Evite ir a lugares en que el agua o los alimentos podrían causarle joel enfermedad.  · Asegúrese de tener al día todas amy vacunas, incluso la de la gripe. [place] es especialmente importante si viaja al exterior.   Tómese un tiempo para relajarse  Reserve tiempo para descansar y relajarse  en el trabajo o la casa.  · [place] descansos breves todos los mary. [place] ejercicios de relajación.  · En períodos de tensión, respire hondo.  · Trate de no asumir demasiadas responsabilidades. Programe amy quehaceres para los momentos en que tenga más energía.  · Siempre que pueda, duerma siesta o siéntese y relájese.  · Después de la semana 16, no se acueste de espalda por más de unos minutos. Acuéstese de costado y cambie de lado a menudo.  Las relaciones sexuales  A menos que hernández proveedor de atención médica le indique lo contrario, no hay ningún motivo que le impida tener relaciones sexuales ahora. El suministro de igor a la yonis pélvica aumenta en el luanne trimestre, por lo que el sexo podría resultarle más agradable. Pruebe con diferentes posiciones para canelo la que mejor le funciona. Además, hable con hernández erica si nota algún cambio en hernández apetito sexual. Es posible que después del acto sexual, note algunas manchas de igor, jamel no deje de informar a hernández proveedor de atención médica si tiene sangrado abundante.  La seguridad de hernández ambiente  Podrá seguir limpiando hernández casa y usando productos perfumados. Magy con que tome unas sencillas precauciones:  · Use guantes cuando vaya a emplear detergentes y desinfectantes líquidos.  · Aris las ventanas para que entre aire fresco. Si está pintando, use un ventilador.  · Evite el humo de segunda mano.  · No inhale los vapores del esmalte de uñas, las lacas para el pelo, los limpiadores u otras sustancias químicas.  Date Last Reviewed: 8/16/2015  © 7979-9045 [employer]. [address] [place] [address], [place] [address]. Todos los derechos reservados. Esta información no pretende sustituir la atención médica profesional. Sólo hernández médico puede diagnosticar y tratar un problema de eddie.

## 2018-12-13 ENCOUNTER — CLINICAL SUPPORT (OUTPATIENT)
Dept: OBSTETRICS AND GYNECOLOGY | Facility: CLINIC | Age: 28
End: 2018-12-13
Payer: MEDICAID

## 2018-12-13 ENCOUNTER — LAB VISIT (OUTPATIENT)
Dept: LAB | Facility: HOSPITAL | Age: 28
End: 2018-12-13
Attending: OBSTETRICS & GYNECOLOGY
Payer: MEDICAID

## 2018-12-13 ENCOUNTER — ROUTINE PRENATAL (OUTPATIENT)
Dept: OBSTETRICS AND GYNECOLOGY | Facility: CLINIC | Age: 28
End: 2018-12-13
Payer: MEDICAID

## 2018-12-13 ENCOUNTER — PATIENT MESSAGE (OUTPATIENT)
Dept: OBSTETRICS AND GYNECOLOGY | Facility: CLINIC | Age: 28
End: 2018-12-13

## 2018-12-13 VITALS
SYSTOLIC BLOOD PRESSURE: 104 MMHG | WEIGHT: 191.81 LBS | BODY MASS INDEX: 36.24 KG/M2 | WEIGHT: 192 LBS | BODY MASS INDEX: 36.28 KG/M2 | DIASTOLIC BLOOD PRESSURE: 62 MMHG

## 2018-12-13 DIAGNOSIS — Z34.90 ENCOUNTER FOR SUPERVISION OF NORMAL PREGNANCY, ANTEPARTUM, UNSPECIFIED GRAVIDITY: ICD-10-CM

## 2018-12-13 DIAGNOSIS — Z23 NEED FOR TDAP VACCINATION: ICD-10-CM

## 2018-12-13 DIAGNOSIS — Z34.90 ENCOUNTER FOR SUPERVISION OF NORMAL PREGNANCY, ANTEPARTUM, UNSPECIFIED GRAVIDITY: Primary | ICD-10-CM

## 2018-12-13 DIAGNOSIS — O09.30 LATE PRENATAL CARE: ICD-10-CM

## 2018-12-13 DIAGNOSIS — Z23 NEEDS FLU SHOT: Primary | ICD-10-CM

## 2018-12-13 DIAGNOSIS — O99.810 PREGNANCY WITH ABNORMAL GLUCOSE TOLERANCE TEST: Primary | ICD-10-CM

## 2018-12-13 LAB — GLUCOSE SERPL-MCNC: 157 MG/DL

## 2018-12-13 PROCEDURE — 90472 IMMUNIZATION ADMIN EACH ADD: CPT | Mod: PBBFAC,VFC

## 2018-12-13 PROCEDURE — 36415 COLL VENOUS BLD VENIPUNCTURE: CPT

## 2018-12-13 PROCEDURE — 82950 GLUCOSE TEST: CPT

## 2018-12-13 PROCEDURE — 90471 IMMUNIZATION ADMIN: CPT | Mod: PBBFAC,VFC

## 2018-12-13 PROCEDURE — 99999 PR PBB SHADOW E&M-EST. PATIENT-LVL II: CPT | Mod: PBBFAC,,,

## 2018-12-13 PROCEDURE — 99212 OFFICE O/P EST SF 10 MIN: CPT | Mod: PBBFAC,25

## 2018-12-13 PROCEDURE — 99213 OFFICE O/P EST LOW 20 MIN: CPT | Mod: PBBFAC,27,TH | Performed by: OBSTETRICS & GYNECOLOGY

## 2018-12-13 PROCEDURE — 90715 TDAP VACCINE 7 YRS/> IM: CPT | Mod: PBBFAC,SL

## 2018-12-13 PROCEDURE — 99214 OFFICE O/P EST MOD 30 MIN: CPT | Mod: TH,S$PBB,, | Performed by: OBSTETRICS & GYNECOLOGY

## 2018-12-13 PROCEDURE — 99999 PR PBB SHADOW E&M-EST. PATIENT-LVL III: CPT | Mod: PBBFAC,,, | Performed by: OBSTETRICS & GYNECOLOGY

## 2018-12-13 NOTE — PROGRESS NOTES
Complaints today: Ms. Pendleton is doing well. She reports that she has been having sharps pains in her abdomen but also admits to spending most days sitting down or sleeping. Normal fetal movements with no vaginal bleeding, LOF or contractions at this time.       /62   Wt 87.1 kg (192 lb 0.3 oz)   LMP 2018   BMI 36.28 kg/m²     28 y.o., at 28w2d by Estimated Date of Delivery: 3/5/19  Patient Active Problem List   Diagnosis    Encounter for supervision of normal pregnancy, antepartum    Late prenatal care    HSIL (high grade squamous intraepithelial lesion) on Pap smear of cervix    Dysplasia of cervix, high grade HELENE 2    Encounter for fetal anatomic survey     OB History    Para Term  AB Living   4 3 3     3   SAB TAB Ectopic Multiple Live Births           1      # Outcome Date GA Lbr Jules/2nd Weight Sex Delivery Anes PTL Lv   4 Current            3 Term 02/15/15 40w0d   F Vag-Spont  N    2 Term 12 40w0d   F Vag-Spont  N JULY   1 Term 11/01/10 37w0d   M Vag-Spont             Dating reviewed    Allergies and problem list reviewed and updated    Medical and surgical history reviewed    Prenatal labs reviewed and updated    Physical Exam:  ABD: soft, gravid, nontender, 29 cm    Assessment:  Anila was seen today for routine prenatal visit.    Diagnoses and all orders for this visit:    Encounter for supervision of normal pregnancy, antepartum, unspecified   -     OB Glucose Screen; Future  - Tdap and flu shot given today  - Discussed with patient the importance of moving and walking. Patient is to walk outside up her block 2x a day with FOB.    Late prenatal care        Orders Placed This Encounter   Procedures    OB Glucose Screen       Follow-up in about 2 weeks (around 2018) for routine OB.

## 2018-12-13 NOTE — PATIENT INSTRUCTIONS
Adapting to Pregnancy: Third Trimester    Although common during pregnancy, some discomforts may seem worse in the final weeks. Simple lifestyle changes can help. Take care of yourself. And ask your partner to help out with small tasks.  Limiting leg problems  Ways to combat leg issues:  · Wear support hose all day.  · Avoid snug shoes and clothes that bind, like tight pants and socks with elastic tops.  · Sit with your feet and legs raised often.  Caring for your breasts  Tips to follow include:  · Wash with plain water. Avoid using harsh soaps or rubbing alcohol. They may cause dryness.  · Wear a nursing bra for extra support. It can also hide any leaks from your nipples.  Controlling hemorrhoids  Ways to avoid hemorrhoids include:  · Eat foods that are high in fiber. Also, exercise and drink enough fluids. This will reduce constipation and hemorrhoids.  · Sleep and nap on your side. This limits pressure on the veins of your rectum.  · Try not to stand or sit for long periods.  Controlling back pain  As your body changes during pregnancy, your back must work in new ways. Back pain is due to many causes. Physical changes in your body can strain your back and its supporting muscles. Also, hormones (chemicals that carry messages throughout the body) increase during pregnancy. This can affect how your muscles and joints work together. All of these changes can lead to pain. Pain may be felt in the upper or lower back. Pain is also common in the pelvis. Some pregnant women have sciatica. This is pain caused by pressure on the sciatic nerve running down the back of the leg. Ask your healthcare provider for specific tips and exercises to help control your back pain.  Tips to help you rest  Good rest and sleep will help you feel better. Here are some ideas:  · Ask your partner to massage your shoulders, neck, or back.  · Limit the errands you do each day.  · Lie down in the afternoon or after work for a few  minutes.  · Take a warm bath before you go to sleep.  · Drink warm milk or teas without caffeine.  · Avoid coffee, black tea, and cola.  Stopping heartburn  · Avoid spicy or acidic foods.  · Eat small amounts more often. Eat slowly. · Wait 2 hours after eating before lying down.  · Sleep with your upper body raised 6 inches.   Managing mood swings  Ways to manage mood swings include:  · Know that mood changes are normal.  · Exercise often, but get plenty of rest.  · Address any concerns and limit stress. Talking to your partner, other women, or your healthcare provider may help.  Dealing with urinary frequency  Tips to deal with having to urinate often include:  · Drink plenty of water all day. If you drink a lot in the evening, though, you may have to get up more in the night.  · Limit coffee, black tea, and cola.  Date Last Reviewed: 8/16/2015  © 5706-6328 [employer]. [address] [place] [address], [place] [address]. All rights reserved. This information is not intended as a substitute for professional medical care. Always follow your healthcare professional's instructions.

## 2018-12-13 NOTE — PROGRESS NOTES
Pt here for T- DAP injection, explained what the medication is for, TDAP handout given to pt,  reviewed documentation of medication with pt, injection given via L Deltoid w/o difficulty. Pt stated her understanding of all instructions. Instructed pt to wait in the waiting room for 10-15 mins in case of an immediate adverse reaction  Flu shot given via  r deltoid w/o incident, flu shot handout given to pt, instructed pt to wait in the waiting room for 10-15 mins in case of an immediate adverse reaction

## 2018-12-20 ENCOUNTER — LAB VISIT (OUTPATIENT)
Dept: LAB | Facility: HOSPITAL | Age: 28
End: 2018-12-20
Attending: OBSTETRICS & GYNECOLOGY
Payer: MEDICAID

## 2018-12-20 DIAGNOSIS — O99.810 PREGNANCY WITH ABNORMAL GLUCOSE TOLERANCE TEST: ICD-10-CM

## 2018-12-20 LAB
GLUCOSE SERPL-MCNC: 119 MG/DL
GLUCOSE SERPL-MCNC: 154 MG/DL
GLUCOSE SERPL-MCNC: 171 MG/DL
GLUCOSE SERPL-MCNC: 91 MG/DL

## 2018-12-20 PROCEDURE — 82951 GLUCOSE TOLERANCE TEST (GTT): CPT

## 2018-12-20 PROCEDURE — 82952 GTT-ADDED SAMPLES: CPT

## 2018-12-20 PROCEDURE — 36415 COLL VENOUS BLD VENIPUNCTURE: CPT

## 2018-12-27 ENCOUNTER — ROUTINE PRENATAL (OUTPATIENT)
Dept: OBSTETRICS AND GYNECOLOGY | Facility: CLINIC | Age: 28
End: 2018-12-27
Payer: MEDICAID

## 2018-12-27 VITALS
BODY MASS INDEX: 36.68 KG/M2 | SYSTOLIC BLOOD PRESSURE: 116 MMHG | WEIGHT: 194.13 LBS | DIASTOLIC BLOOD PRESSURE: 60 MMHG

## 2018-12-27 DIAGNOSIS — R82.998 LEUKOCYTES IN URINE: Primary | ICD-10-CM

## 2018-12-27 DIAGNOSIS — Z34.90 ENCOUNTER FOR SUPERVISION OF NORMAL PREGNANCY, ANTEPARTUM, UNSPECIFIED GRAVIDITY: ICD-10-CM

## 2018-12-27 DIAGNOSIS — N89.8 VAGINAL DISCHARGE: ICD-10-CM

## 2018-12-27 PROCEDURE — 99213 OFFICE O/P EST LOW 20 MIN: CPT | Mod: TH,S$PBB,, | Performed by: OBSTETRICS & GYNECOLOGY

## 2018-12-27 PROCEDURE — 99999 PR PBB SHADOW E&M-EST. PATIENT-LVL II: CPT | Mod: PBBFAC,,, | Performed by: OBSTETRICS & GYNECOLOGY

## 2018-12-27 PROCEDURE — 87086 URINE CULTURE/COLONY COUNT: CPT

## 2018-12-27 PROCEDURE — 99212 OFFICE O/P EST SF 10 MIN: CPT | Mod: PBBFAC,TH | Performed by: OBSTETRICS & GYNECOLOGY

## 2018-12-27 PROCEDURE — 87660 TRICHOMONAS VAGIN DIR PROBE: CPT

## 2018-12-27 NOTE — PROGRESS NOTES
Complaints today: Ms. Pendleton is doing well. She c/o abdominal pain while lying down but improved when she stood up. She also reports that she has vaginal discharge but she is unsure if it normal. Normal fetal movements with no vaginal bleeding, LOF or contractions at this time.       /60   Wt 88 kg (194 lb 1.8 oz)   LMP 2018   BMI 36.68 kg/m²     28 y.o., at 30w2d by Estimated Date of Delivery: 3/5/19  Patient Active Problem List   Diagnosis    Encounter for supervision of normal pregnancy, antepartum    Late prenatal care    HSIL (high grade squamous intraepithelial lesion) on Pap smear of cervix    Dysplasia of cervix, high grade HELENE 2    Encounter for fetal anatomic survey     OB History    Para Term  AB Living   4 3 3     3   SAB TAB Ectopic Multiple Live Births           1      # Outcome Date GA Lbr Jules/2nd Weight Sex Delivery Anes PTL Lv   4 Current            3 Term 02/15/15 40w0d   F Vag-Spont  N    2 Term 12 40w0d   F Vag-Spont  N JULY   1 Term 11/01/10 37w0d   M Vag-Spont             Dating reviewed    Allergies and problem list reviewed and updated    Medical and surgical history reviewed    Prenatal labs reviewed and updated    Physical Exam:  ABD: soft, gravid, nontender, 30 cm    Assessment:  Anila was seen today for routine prenatal visit.    Diagnoses and all orders for this visit:    Leukocytes in urine  -     Urine culture    Encounter for supervision of normal pregnancy, antepartum, unspecified   - Discussed Birth Control patient will like the depo provera shot  - Discussed preregistration at this time; paperwork given  - Breast feeding vs bottle feeding; patient will attempt breast feeding  - Bleeding/pain, kick counts, labor precautions given     Vaginal discharge  -     Vaginosis Screen by DNA Probe    Orders Placed This Encounter   Procedures    Urine culture       Follow-up in about 2 weeks (around 1/10/2019) for Routine OB.

## 2018-12-27 NOTE — PATIENT INSTRUCTIONS
Adapting to Pregnancy: Third Trimester    Although common during pregnancy, some discomforts may seem worse in the final weeks. Simple lifestyle changes can help. Take care of yourself. And ask your partner to help out with small tasks.  Limiting leg problems  Ways to combat leg issues:  · Wear support hose all day.  · Avoid snug shoes and clothes that bind, like tight pants and socks with elastic tops.  · Sit with your feet and legs raised often.  Caring for your breasts  Tips to follow include:  · Wash with plain water. Avoid using harsh soaps or rubbing alcohol. They may cause dryness.  · Wear a nursing bra for extra support. It can also hide any leaks from your nipples.  Controlling hemorrhoids  Ways to avoid hemorrhoids include:  · Eat foods that are high in fiber. Also, exercise and drink enough fluids. This will reduce constipation and hemorrhoids.  · Sleep and nap on your side. This limits pressure on the veins of your rectum.  · Try not to stand or sit for long periods.  Controlling back pain  As your body changes during pregnancy, your back must work in new ways. Back pain is due to many causes. Physical changes in your body can strain your back and its supporting muscles. Also, hormones (chemicals that carry messages throughout the body) increase during pregnancy. This can affect how your muscles and joints work together. All of these changes can lead to pain. Pain may be felt in the upper or lower back. Pain is also common in the pelvis. Some pregnant women have sciatica. This is pain caused by pressure on the sciatic nerve running down the back of the leg. Ask your healthcare provider for specific tips and exercises to help control your back pain.  Tips to help you rest  Good rest and sleep will help you feel better. Here are some ideas:  · Ask your partner to massage your shoulders, neck, or back.  · Limit the errands you do each day.  · Lie down in the afternoon or after work for a few  minutes.  · Take a warm bath before you go to sleep.  · Drink warm milk or teas without caffeine.  · Avoid coffee, black tea, and cola.  Stopping heartburn  · Avoid spicy or acidic foods.  · Eat small amounts more often. Eat slowly. · Wait 2 hours after eating before lying down.  · Sleep with your upper body raised 6 inches.   Managing mood swings  Ways to manage mood swings include:  · Know that mood changes are normal.  · Exercise often, but get plenty of rest.  · Address any concerns and limit stress. Talking to your partner, other women, or your healthcare provider may help.  Dealing with urinary frequency  Tips to deal with having to urinate often include:  · Drink plenty of water all day. If you drink a lot in the evening, though, you may have to get up more in the night.  · Limit coffee, black tea, and cola.  Date Last Reviewed: 8/16/2015  © 9151-0713 [employer]. [address] [place] [address], [place] [address]. All rights reserved. This information is not intended as a substitute for professional medical care. Always follow your healthcare professional's instructions.

## 2018-12-28 LAB
CANDIDA RRNA VAG QL PROBE: NEGATIVE
G VAGINALIS RRNA GENITAL QL PROBE: NEGATIVE
T VAGINALIS RRNA GENITAL QL PROBE: POSITIVE

## 2018-12-29 ENCOUNTER — PATIENT MESSAGE (OUTPATIENT)
Dept: OBSTETRICS AND GYNECOLOGY | Facility: CLINIC | Age: 28
End: 2018-12-29

## 2018-12-29 DIAGNOSIS — A59.9 TRICHOMONAS VAGINALIS INFECTION: Primary | ICD-10-CM

## 2018-12-29 LAB — BACTERIA UR CULT: NORMAL

## 2018-12-29 RX ORDER — METRONIDAZOLE 500 MG/1
2000 TABLET ORAL ONCE
Qty: 4 TABLET | Refills: 0 | Status: SHIPPED | OUTPATIENT
Start: 2018-12-29 | End: 2018-12-29

## 2018-12-31 DIAGNOSIS — N92.6 MISSED PERIOD: ICD-10-CM

## 2019-01-10 ENCOUNTER — ROUTINE PRENATAL (OUTPATIENT)
Dept: OBSTETRICS AND GYNECOLOGY | Facility: CLINIC | Age: 29
End: 2019-01-10
Payer: MEDICAID

## 2019-01-10 VITALS
SYSTOLIC BLOOD PRESSURE: 112 MMHG | DIASTOLIC BLOOD PRESSURE: 64 MMHG | BODY MASS INDEX: 37.32 KG/M2 | WEIGHT: 197.56 LBS

## 2019-01-10 DIAGNOSIS — Z34.90 ENCOUNTER FOR SUPERVISION OF NORMAL PREGNANCY, ANTEPARTUM, UNSPECIFIED GRAVIDITY: Primary | ICD-10-CM

## 2019-01-10 PROCEDURE — 99999 PR PBB SHADOW E&M-EST. PATIENT-LVL II: ICD-10-PCS | Mod: PBBFAC,,, | Performed by: OBSTETRICS & GYNECOLOGY

## 2019-01-10 PROCEDURE — 99214 PR OFFICE/OUTPT VISIT, EST, LEVL IV, 30-39 MIN: ICD-10-PCS | Mod: TH,S$PBB,, | Performed by: OBSTETRICS & GYNECOLOGY

## 2019-01-10 PROCEDURE — 99212 OFFICE O/P EST SF 10 MIN: CPT | Mod: PBBFAC,TH | Performed by: OBSTETRICS & GYNECOLOGY

## 2019-01-10 PROCEDURE — 99999 PR PBB SHADOW E&M-EST. PATIENT-LVL II: CPT | Mod: PBBFAC,,, | Performed by: OBSTETRICS & GYNECOLOGY

## 2019-01-10 PROCEDURE — 99214 OFFICE O/P EST MOD 30 MIN: CPT | Mod: TH,S$PBB,, | Performed by: OBSTETRICS & GYNECOLOGY

## 2019-01-10 NOTE — PATIENT INSTRUCTIONS
Adapting to Pregnancy: Third Trimester    Although common during pregnancy, some discomforts may seem worse in the final weeks. Simple lifestyle changes can help. Take care of yourself. And ask your partner to help out with small tasks.  Limiting leg problems  Ways to combat leg issues:  · Wear support hose all day.  · Avoid snug shoes and clothes that bind, like tight pants and socks with elastic tops.  · Sit with your feet and legs raised often.  Caring for your breasts  Tips to follow include:  · Wash with plain water. Avoid using harsh soaps or rubbing alcohol. They may cause dryness.  · Wear a nursing bra for extra support. It can also hide any leaks from your nipples.  Controlling hemorrhoids  Ways to avoid hemorrhoids include:  · Eat foods that are high in fiber. Also, exercise and drink enough fluids. This will reduce constipation and hemorrhoids.  · Sleep and nap on your side. This limits pressure on the veins of your rectum.  · Try not to stand or sit for long periods.  Controlling back pain  As your body changes during pregnancy, your back must work in new ways. Back pain is due to many causes. Physical changes in your body can strain your back and its supporting muscles. Also, hormones (chemicals that carry messages throughout the body) increase during pregnancy. This can affect how your muscles and joints work together. All of these changes can lead to pain. Pain may be felt in the upper or lower back. Pain is also common in the pelvis. Some pregnant women have sciatica. This is pain caused by pressure on the sciatic nerve running down the back of the leg. Ask your healthcare provider for specific tips and exercises to help control your back pain.  Tips to help you rest  Good rest and sleep will help you feel better. Here are some ideas:  · Ask your partner to massage your shoulders, neck, or back.  · Limit the errands you do each day.  · Lie down in the afternoon or after work for a few  minutes.  · Take a warm bath before you go to sleep.  · Drink warm milk or teas without caffeine.  · Avoid coffee, black tea, and cola.  Stopping heartburn  · Avoid spicy or acidic foods.  · Eat small amounts more often. Eat slowly. · Wait 2 hours after eating before lying down.  · Sleep with your upper body raised 6 inches.   Managing mood swings  Ways to manage mood swings include:  · Know that mood changes are normal.  · Exercise often, but get plenty of rest.  · Address any concerns and limit stress. Talking to your partner, other women, or your healthcare provider may help.  Dealing with urinary frequency  Tips to deal with having to urinate often include:  · Drink plenty of water all day. If you drink a lot in the evening, though, you may have to get up more in the night.  · Limit coffee, black tea, and cola.  Date Last Reviewed: 8/16/2015  © 3340-7073 [employer]. [address] [place] [address], [place] [address]. All rights reserved. This information is not intended as a substitute for professional medical care. Always follow your healthcare professional's instructions.

## 2019-01-14 ENCOUNTER — PATIENT MESSAGE (OUTPATIENT)
Dept: OBSTETRICS AND GYNECOLOGY | Facility: CLINIC | Age: 29
End: 2019-01-14

## 2019-01-15 ENCOUNTER — TELEPHONE (OUTPATIENT)
Dept: OBSTETRICS AND GYNECOLOGY | Facility: CLINIC | Age: 29
End: 2019-01-15

## 2019-01-15 NOTE — TELEPHONE ENCOUNTER
Discussed with patient that fetal DNA could only be obtain definitively from Amniocentesis or [facility] which carries an increased risk of Miscarriage and should not be done for paternity alone nor would it be covered by insurance for that reason. Patient agrees and understands that she only has 7 more weeks before delivery at which time they could have DNA testing done safely.      Shiva Can MD

## 2019-01-24 ENCOUNTER — ROUTINE PRENATAL (OUTPATIENT)
Dept: OBSTETRICS AND GYNECOLOGY | Facility: CLINIC | Age: 29
End: 2019-01-24
Payer: MEDICAID

## 2019-01-24 VITALS
BODY MASS INDEX: 37.43 KG/M2 | DIASTOLIC BLOOD PRESSURE: 66 MMHG | SYSTOLIC BLOOD PRESSURE: 102 MMHG | WEIGHT: 198.06 LBS

## 2019-01-24 DIAGNOSIS — O26.893 VAGINAL DISCHARGE DURING PREGNANCY IN THIRD TRIMESTER: ICD-10-CM

## 2019-01-24 DIAGNOSIS — Z34.90 ENCOUNTER FOR SUPERVISION OF NORMAL PREGNANCY, ANTEPARTUM, UNSPECIFIED GRAVIDITY: Primary | ICD-10-CM

## 2019-01-24 DIAGNOSIS — N89.8 VAGINAL DISCHARGE DURING PREGNANCY IN THIRD TRIMESTER: ICD-10-CM

## 2019-01-24 PROCEDURE — 99213 OFFICE O/P EST LOW 20 MIN: CPT | Mod: PBBFAC,TH | Performed by: OBSTETRICS & GYNECOLOGY

## 2019-01-24 PROCEDURE — 87491 CHLMYD TRACH DNA AMP PROBE: CPT

## 2019-01-24 PROCEDURE — 99999 PR PBB SHADOW E&M-EST. PATIENT-LVL III: CPT | Mod: PBBFAC,,, | Performed by: OBSTETRICS & GYNECOLOGY

## 2019-01-24 PROCEDURE — 99999 PR PBB SHADOW E&M-EST. PATIENT-LVL III: ICD-10-PCS | Mod: PBBFAC,,, | Performed by: OBSTETRICS & GYNECOLOGY

## 2019-01-24 PROCEDURE — 87480 CANDIDA DNA DIR PROBE: CPT

## 2019-01-24 PROCEDURE — 99214 PR OFFICE/OUTPT VISIT, EST, LEVL IV, 30-39 MIN: ICD-10-PCS | Mod: TH,S$PBB,, | Performed by: OBSTETRICS & GYNECOLOGY

## 2019-01-24 PROCEDURE — 87086 URINE CULTURE/COLONY COUNT: CPT

## 2019-01-24 PROCEDURE — 99214 OFFICE O/P EST MOD 30 MIN: CPT | Mod: TH,S$PBB,, | Performed by: OBSTETRICS & GYNECOLOGY

## 2019-01-24 NOTE — PATIENT INSTRUCTIONS
Adapting to Pregnancy: Third Trimester    Although common during pregnancy, some discomforts may seem worse in the final weeks. Simple lifestyle changes can help. Take care of yourself. And ask your partner to help out with small tasks.  Limiting leg problems  Ways to combat leg issues:  · Wear support hose all day.  · Avoid snug shoes and clothes that bind, like tight pants and socks with elastic tops.  · Sit with your feet and legs raised often.  Caring for your breasts  Tips to follow include:  · Wash with plain water. Avoid using harsh soaps or rubbing alcohol. They may cause dryness.  · Wear a nursing bra for extra support. It can also hide any leaks from your nipples.  Controlling hemorrhoids  Ways to avoid hemorrhoids include:  · Eat foods that are high in fiber. Also, exercise and drink enough fluids. This will reduce constipation and hemorrhoids.  · Sleep and nap on your side. This limits pressure on the veins of your rectum.  · Try not to stand or sit for long periods.  Controlling back pain  As your body changes during pregnancy, your back must work in new ways. Back pain is due to many causes. Physical changes in your body can strain your back and its supporting muscles. Also, hormones (chemicals that carry messages throughout the body) increase during pregnancy. This can affect how your muscles and joints work together. All of these changes can lead to pain. Pain may be felt in the upper or lower back. Pain is also common in the pelvis. Some pregnant women have sciatica. This is pain caused by pressure on the sciatic nerve running down the back of the leg. Ask your healthcare provider for specific tips and exercises to help control your back pain.  Tips to help you rest  Good rest and sleep will help you feel better. Here are some ideas:  · Ask your partner to massage your shoulders, neck, or back.  · Limit the errands you do each day.  · Lie down in the afternoon or after work for a few  minutes.  · Take a warm bath before you go to sleep.  · Drink warm milk or teas without caffeine.  · Avoid coffee, black tea, and cola.  Stopping heartburn  · Avoid spicy or acidic foods.  · Eat small amounts more often. Eat slowly. · Wait 2 hours after eating before lying down.  · Sleep with your upper body raised 6 inches.   Managing mood swings  Ways to manage mood swings include:  · Know that mood changes are normal.  · Exercise often, but get plenty of rest.  · Address any concerns and limit stress. Talking to your partner, other women, or your healthcare provider may help.  Dealing with urinary frequency  Tips to deal with having to urinate often include:  · Drink plenty of water all day. If you drink a lot in the evening, though, you may have to get up more in the night.  · Limit coffee, black tea, and cola.  Date Last Reviewed: 8/16/2015  © 9216-6647 [employer]. [address] [place] [address], [place] [address]. All rights reserved. This information is not intended as a substitute for professional medical care. Always follow your healthcare professional's instructions.

## 2019-01-24 NOTE — PROGRESS NOTES
Complaints today: Ms. Pendleton is doing well. Patient c/o back pain and cramping. She admits that she does not drink much water.  Normal fetal movements with no vaginal bleeding, LOF or contractions at this time.     /66   Wt 89.8 kg (198 lb 1.3 oz)   LMP 2018   BMI 37.43 kg/m²     28 y.o., at 34w2d by Estimated Date of Delivery: 3/5/19  Patient Active Problem List   Diagnosis    Encounter for supervision of normal pregnancy, antepartum    Late prenatal care    HSIL (high grade squamous intraepithelial lesion) on Pap smear of cervix    Dysplasia of cervix, high grade HELENE 2    Encounter for fetal anatomic survey     OB History    Para Term  AB Living   4 3 3     3   SAB TAB Ectopic Multiple Live Births           1      # Outcome Date GA Lbr Jules/2nd Weight Sex Delivery Anes PTL Lv   4 Current            3 Term 02/15/15 40w0d   F Vag-Spont  N    2 Term 12 40w0d   F Vag-Spont  N JULY   1 Term 11/01/10 37w0d   M Vag-Spont             Dating reviewed    Allergies and problem list reviewed and updated    Medical and surgical history reviewed    Prenatal labs reviewed and updated    Physical Exam:  ABD: soft, gravid, nontender, 34    Assessment:  Anila was seen today for routine prenatal visit.    Diagnoses and all orders for this visit:    Encounter for supervision of normal pregnancy, antepartum, unspecified   -     Urine culture  -     POCT URINE DIPSTICK WITHOUT MICROSCOPE  - GBS/labs/consents at the next visit  - Bleeding/pain, kick counts, labor precautions       Vaginal discharge during pregnancy in third trimester  -     Vaginosis Screen by DNA Probe  -     C. trachomatis/N. gonorrhoeae by AMP DNA        Orders Placed This Encounter   Procedures    Vaginosis Screen by DNA Probe    Urine culture    C. trachomatis/N. gonorrhoeae by AMP DNA    POCT URINE DIPSTICK WITHOUT MICROSCOPE       Follow-up in about 1 week (around 2019) for routine OB.

## 2019-01-25 LAB
CANDIDA RRNA VAG QL PROBE: POSITIVE
G VAGINALIS RRNA GENITAL QL PROBE: POSITIVE
T VAGINALIS RRNA GENITAL QL PROBE: POSITIVE

## 2019-01-26 LAB
BACTERIA UR CULT: NORMAL
C TRACH DNA SPEC QL NAA+PROBE: NOT DETECTED
N GONORRHOEA DNA SPEC QL NAA+PROBE: NOT DETECTED

## 2019-01-28 ENCOUNTER — TELEPHONE (OUTPATIENT)
Dept: OBSTETRICS AND GYNECOLOGY | Facility: CLINIC | Age: 29
End: 2019-01-28

## 2019-01-28 DIAGNOSIS — N76.0 BV (BACTERIAL VAGINOSIS): ICD-10-CM

## 2019-01-28 DIAGNOSIS — A59.9 TRICHOMONOSIS: Primary | ICD-10-CM

## 2019-01-28 DIAGNOSIS — B96.89 BV (BACTERIAL VAGINOSIS): ICD-10-CM

## 2019-01-28 DIAGNOSIS — B37.31 YEAST VAGINITIS: ICD-10-CM

## 2019-01-28 RX ORDER — TERCONAZOLE 8 MG/G
1 CREAM VAGINAL NIGHTLY
Qty: 20 G | Refills: 0 | Status: SHIPPED | OUTPATIENT
Start: 2019-01-28 | End: 2019-01-31

## 2019-01-28 RX ORDER — CLINDAMYCIN HYDROCHLORIDE 300 MG/1
300 CAPSULE ORAL 2 TIMES DAILY
Qty: 14 CAPSULE | Refills: 0 | Status: SHIPPED | OUTPATIENT
Start: 2019-01-28 | End: 2019-02-04

## 2019-01-28 RX ORDER — METRONIDAZOLE 500 MG/1
2000 TABLET ORAL ONCE
Qty: 4 TABLET | Refills: 1 | Status: SHIPPED | OUTPATIENT
Start: 2019-01-28 | End: 2019-01-28

## 2019-01-30 ENCOUNTER — HOSPITAL ENCOUNTER (OUTPATIENT)
Facility: HOSPITAL | Age: 29
Discharge: HOME | End: 2019-01-31
Attending: OBSTETRICS & GYNECOLOGY | Admitting: OBSTETRICS & GYNECOLOGY
Payer: MEDICAID

## 2019-01-30 DIAGNOSIS — R10.9 ABDOMINAL PAIN: ICD-10-CM

## 2019-01-30 PROCEDURE — 25000003 PHARM REV CODE 250: Performed by: OBSTETRICS & GYNECOLOGY

## 2019-01-30 PROCEDURE — 99211 OFF/OP EST MAY X REQ PHY/QHP: CPT

## 2019-01-30 RX ORDER — ACETAMINOPHEN 500 MG
500 TABLET ORAL EVERY 6 HOURS PRN
Status: DISCONTINUED | OUTPATIENT
Start: 2019-01-30 | End: 2019-01-31 | Stop reason: HOSPADM

## 2019-01-30 RX ORDER — ONDANSETRON 8 MG/1
8 TABLET, ORALLY DISINTEGRATING ORAL EVERY 8 HOURS PRN
Status: DISCONTINUED | OUTPATIENT
Start: 2019-01-30 | End: 2019-01-31 | Stop reason: HOSPADM

## 2019-01-30 RX ORDER — SODIUM CHLORIDE, SODIUM LACTATE, POTASSIUM CHLORIDE, CALCIUM CHLORIDE 600; 310; 30; 20 MG/100ML; MG/100ML; MG/100ML; MG/100ML
INJECTION, SOLUTION INTRAVENOUS CONTINUOUS
Status: DISCONTINUED | OUTPATIENT
Start: 2019-01-30 | End: 2019-01-31 | Stop reason: HOSPADM

## 2019-01-30 RX ADMIN — SODIUM CHLORIDE, SODIUM LACTATE, POTASSIUM CHLORIDE, AND CALCIUM CHLORIDE: .6; .31; .03; .02 INJECTION, SOLUTION INTRAVENOUS at 09:01

## 2019-01-30 RX ADMIN — SODIUM CHLORIDE, SODIUM LACTATE, POTASSIUM CHLORIDE, AND CALCIUM CHLORIDE 1000 ML: .6; .31; .03; .02 INJECTION, SOLUTION INTRAVENOUS at 08:01

## 2019-01-31 ENCOUNTER — ROUTINE PRENATAL (OUTPATIENT)
Dept: OBSTETRICS AND GYNECOLOGY | Facility: CLINIC | Age: 29
End: 2019-01-31
Payer: MEDICAID

## 2019-01-31 ENCOUNTER — PATIENT MESSAGE (OUTPATIENT)
Dept: OBSTETRICS AND GYNECOLOGY | Facility: CLINIC | Age: 29
End: 2019-01-31

## 2019-01-31 VITALS — SYSTOLIC BLOOD PRESSURE: 90 MMHG | DIASTOLIC BLOOD PRESSURE: 60 MMHG | BODY MASS INDEX: 37.8 KG/M2 | WEIGHT: 200.06 LBS

## 2019-01-31 VITALS — DIASTOLIC BLOOD PRESSURE: 66 MMHG | SYSTOLIC BLOOD PRESSURE: 107 MMHG | OXYGEN SATURATION: 98 % | HEART RATE: 101 BPM

## 2019-01-31 DIAGNOSIS — R82.998 LEUKOCYTES IN URINE: ICD-10-CM

## 2019-01-31 DIAGNOSIS — A59.09 TRICHOMONAL CERVICITIS: ICD-10-CM

## 2019-01-31 DIAGNOSIS — Z34.90 ENCOUNTER FOR SUPERVISION OF NORMAL PREGNANCY, ANTEPARTUM, UNSPECIFIED GRAVIDITY: Primary | ICD-10-CM

## 2019-01-31 PROCEDURE — 99999 PR PBB SHADOW E&M-EST. PATIENT-LVL III: CPT | Mod: PBBFAC,,, | Performed by: OBSTETRICS & GYNECOLOGY

## 2019-01-31 PROCEDURE — 99213 OFFICE O/P EST LOW 20 MIN: CPT | Mod: PBBFAC,25,TH | Performed by: OBSTETRICS & GYNECOLOGY

## 2019-01-31 PROCEDURE — 96360 HYDRATION IV INFUSION INIT: CPT

## 2019-01-31 PROCEDURE — 87081 CULTURE SCREEN ONLY: CPT | Mod: 59

## 2019-01-31 PROCEDURE — 96361 HYDRATE IV INFUSION ADD-ON: CPT

## 2019-01-31 PROCEDURE — 99215 PR OFFICE/OUTPT VISIT, EST, LEVL V, 40-54 MIN: ICD-10-PCS | Mod: TH,S$PBB,, | Performed by: OBSTETRICS & GYNECOLOGY

## 2019-01-31 PROCEDURE — 99999 PR PBB SHADOW E&M-EST. PATIENT-LVL III: ICD-10-PCS | Mod: PBBFAC,,, | Performed by: OBSTETRICS & GYNECOLOGY

## 2019-01-31 PROCEDURE — 87086 URINE CULTURE/COLONY COUNT: CPT

## 2019-01-31 PROCEDURE — 99215 OFFICE O/P EST HI 40 MIN: CPT | Mod: TH,S$PBB,, | Performed by: OBSTETRICS & GYNECOLOGY

## 2019-01-31 RX ORDER — METRONIDAZOLE 500 MG/1
2000 TABLET ORAL ONCE
Qty: 4 TABLET | Refills: 0 | Status: SHIPPED | OUTPATIENT
Start: 2019-01-31 | End: 2019-01-31

## 2019-01-31 NOTE — DISCHARGE INSTRUCTIONS
Home Undelivered Discharge Instructions    After Discharge Orders:    Future Appointments   Date Time Provider Department Center   1/31/2019  9:00 AM Shiva Arenas MD [facility]           Current Discharge Medication List      CONTINUE these medications which have NOT CHANGED    Details   clindamycin (CLEOCIN) 300 MG capsule Take 1 capsule (300 mg total) by mouth 2 (two) times daily. for 7 days  Qty: 14 capsule, Refills: 0    Associated Diagnoses: BV (bacterial vaginosis)      PNV,calcium 72-iron-folic acid (PRENATAL VITAMIN PLUS LOW IRON) 27 mg iron- 1 mg Tab Take 1 tablet (1 each total) by mouth once daily.  Qty: 90 tablet, Refills: 3    Associated Diagnoses: Missed period      terconazole (TERAZOL 3) 0.8 % vaginal cream Place 1 applicator vaginally every evening. for 3 days  Qty: 20 g, Refills: 0    Associated Diagnoses: Yeast vaginitis                     · Diet:  normal diet as tolerated    · Rest: normal activity as tolerated    Other instructions: Do kick counts once a day on your baby. Choose the time of day your baby is most active. Get in a comfortable lying or sitting position and time how long it takes to feel 10 kicks, twists, turns, swishes, or rolls. Call your physician or midwife if there have not been 10 kicks in 2 hours    Call physician or midwife, return to Labor and Delivery, call 911, or go to the nearest Emergency Room if: increased leakage or fluid, decreased fetal movement, persistent low back pain or cramping, bleeding from vaginal area, difficulty urinating, pain with urination, difficulty breathing, new calf pain, persistent headache or vision change, contractions every 3-5min for 1 hour    DRINK 10-12 GLASSES OF WATER DAILY  MAY TAKE TYLENOL FOR PAIN    FOLLOW UP WITH APPOINTMENT IN THE MORNING

## 2019-01-31 NOTE — TREATMENT PLAN
Dr Gilman notified of pt's arrival and status - IVF r/t late decel noted and fetal tachycardia with minimal variability.  Also notified of SVE 1.5/thick/high with some bloody mucous, although it does not seem pt is actively bleeding. If reactive fhts, and no s/s active labor, pt may be d/c'd at midnight.  If any concerns, will cont to monitor until morning.

## 2019-01-31 NOTE — PROGRESS NOTES
Complaints today: Ms. Pendleton is doing well this morning. She was in L&D overnight for observation after falling. She reports that she was walking through the room in the dark trying to go the bathroom and fell her butt. She went in when her pelvis began to hurt. She wanted to have the baby checked. She was sent home and just reports feeling tired. She reports occasional pain but improved. She still has not taken medication for Trich, BV and yeast because her boyfriened does not get paid until today. She also reports that she no longer see the Flagyl her raul as an option to be filled. She reports normal fetal movements with no vaginal bleeding, LOF or contractions at this time.       BP 90/60   Wt 90.8 kg (200 lb 1.1 oz)   LMP 2018   BMI 37.80 kg/m²     28 y.o., at 35w2d by Estimated Date of Delivery: 3/5/19  Patient Active Problem List   Diagnosis    Encounter for supervision of normal pregnancy, antepartum    Late prenatal care    HSIL (high grade squamous intraepithelial lesion) on Pap smear of cervix    Dysplasia of cervix, high grade HELENE 2    Encounter for fetal anatomic survey    Abdominal pain     OB History    Para Term  AB Living   4 3 3     3   SAB TAB Ectopic Multiple Live Births           1      # Outcome Date GA Lbr Jules/2nd Weight Sex Delivery Anes PTL Lv   4 Current            3 Term 02/15/15 40w0d   F Vag-Spont  N    2 Term 12 40w0d   F Vag-Spont  N JULY   1 Term 11/01/10 37w0d   M Vag-Spont             Dating reviewed    Allergies and problem list reviewed and updated    Medical and surgical history reviewed    Prenatal labs reviewed and updated    Physical Exam:  ABD: soft, gravid, nontender, 36 cm    Assessment:  Anila was seen today for routine prenatal visit.    Diagnoses and all orders for this visit:    Encounter for supervision of normal pregnancy, antepartum, unspecified   -     CBC auto differential; Future  -     HIV 1/2 Ag/Ab (4th Gen);  Future  -     RPR; Future  -     Strep B Screen, Vaginal / Rectal  - Elective Induction date    -  labor, bleeding, and kick count precautions given to patient  - Consents signed today    Trichomonal cervicitis  -     metroNIDAZOLE (FLAGYL) 500 MG tablet; Take 4 tablets (2,000 mg total) by mouth once. for 1 dose  - Still has not picked up medication. Reports that she does not see it on her Etopus raul. Stressed the importance of them both being treating and refraining from intercourse until they have done so. She reports that she is picking up medication today as he is getting paid        Orders Placed This Encounter   Procedures    Strep B Screen, Vaginal / Rectal    CBC auto differential    HIV 1/2 Ag/Ab (4th Gen)    RPR       Follow-up in about 1 week (around 2019) for routine OB.

## 2019-01-31 NOTE — PATIENT INSTRUCTIONS
Labor and Childbirth: Active Labor  During active labor, your contractions will be stronger and more rhythmic than with early labor. They peak and subside like waves. They may happen 3 to 5 minutes apart and last about 45 to 60 seconds. This part of labor can be hard work. But it is often shorter than early labor. When you reach active labor, exams and tests will be done to see how you and your baby are doing.     Your cervix may dilate 4 to 8 centimeters during the first part of active labor.   Evaluating you and your baby  An exam tells how you and your baby are responding to contractions. Your blood pressure, temperature, and pulse will be checked. A blood or urine sample may also be taken. A fetal monitor will be used to check your babys heart rate. Sometimes an IV (intravenous) line is started to give you medicine and fluids.  Moving ahead with labor  You may now feel contractions in your whole stomach instead of just the lower part (like during early labor). If your amniotic sac has not broken already, it may break now. Or, it may be broken for you. To help your baby descend, change position often. Walking or sitting in a rocking chair or recliner may help. You may find it hard to relax even though you are tired. You may also be less interested in talking than you were earlier. If youre having anesthesia, you will get it now.   Special issues during labor  If labor doesnt progress well or a problem arises, you may need a . But your healthcare providers may take certain steps to help you avoid a :  · If your cervix isnt dilating, a medicine (oxytocin) may be used to augment labor.  · If fetal monitoring shows your baby isnt getting enough oxygen, shifting your body position may help. You may also be given oxygen through a mask.  · If you have preeclampsia (a condition that results in high blood pressure, swelling, and other symptoms), you may be given medicines by IV (intravenous). Your  healthcare provider may also tell you to lie on your left side.  Responding to contractions  During contractions, try to stay relaxed. Tense muscles use more oxygen, eat up your bodys energy, and increase pain. Use the breathing and relaxation techniques you may have learned. And let your support person know how he or she can help. If youve had problems during a previous birth, focus on the present. Keep in mind that no 2 births are the same.     Support persons note  Here's how you can help:  · Have the mother walk or change positions at least once an hour. This improves circulation and helps the baby descend.  · Keep reminding the mother to breathe and relax through each contraction.  · Reassure her. Try to keep her from getting anxious or overstressed.  · Take care of yourself. Take a short break to eat or go to the bathroom when you need to.  · Rest when the mother does. Youll both benefit.   Date Last Reviewed: 8/16/2015  © 2222-3309 [employer]. [address] [place] [address]. All rights reserved. This information is not intended as a substitute for professional medical care. Always follow your healthcare professional's instructions.        Adapting to Pregnancy: Third Trimester    Although common during pregnancy, some discomforts may seem worse in the final weeks. Simple lifestyle changes can help. Take care of yourself. And ask your partner to help out with small tasks.  Limiting leg problems  Ways to combat leg issues:  · Wear support hose all day.  · Avoid snug shoes and clothes that bind, like tight pants and socks with elastic tops.  · Sit with your feet and legs raised often.  Caring for your breasts  Tips to follow include:  · Wash with plain water. Avoid using harsh soaps or rubbing alcohol. They may cause dryness.  · Wear a nursing bra for extra support. It can also hide any leaks from your nipples.  Controlling hemorrhoids  Ways to avoid hemorrhoids include:  · Eat  foods that are high in fiber. Also, exercise and drink enough fluids. This will reduce constipation and hemorrhoids.  · Sleep and nap on your side. This limits pressure on the veins of your rectum.  · Try not to stand or sit for long periods.  Controlling back pain  As your body changes during pregnancy, your back must work in new ways. Back pain is due to many causes. Physical changes in your body can strain your back and its supporting muscles. Also, hormones (chemicals that carry messages throughout the body) increase during pregnancy. This can affect how your muscles and joints work together. All of these changes can lead to pain. Pain may be felt in the upper or lower back. Pain is also common in the pelvis. Some pregnant women have sciatica. This is pain caused by pressure on the sciatic nerve running down the back of the leg. Ask your healthcare provider for specific tips and exercises to help control your back pain.  Tips to help you rest  Good rest and sleep will help you feel better. Here are some ideas:  · Ask your partner to massage your shoulders, neck, or back.  · Limit the errands you do each day.  · Lie down in the afternoon or after work for a few minutes.  · Take a warm bath before you go to sleep.  · Drink warm milk or teas without caffeine.  · Avoid coffee, black tea, and cola.  Stopping heartburn  · Avoid spicy or acidic foods.  · Eat small amounts more often. Eat slowly. · Wait 2 hours after eating before lying down.  · Sleep with your upper body raised 6 inches.   Managing mood swings  Ways to manage mood swings include:  · Know that mood changes are normal.  · Exercise often, but get plenty of rest.  · Address any concerns and limit stress. Talking to your partner, other women, or your healthcare provider may help.  Dealing with urinary frequency  Tips to deal with having to urinate often include:  · Drink plenty of water all day. If you drink a lot in the evening, though, you may have to get  up more in the night.  · Limit coffee, black tea, and cola.  Date Last Reviewed: 8/16/2015  © 1623-3946 [employer]. [address] [place] [address], [place] [address]. All rights reserved. This information is not intended as a substitute for professional medical care. Always follow your healthcare professional's instructions.

## 2019-01-31 NOTE — TREATMENT PLAN
Pt to unit s/p fall around 1100 this AM.  Pt reports she tripped over some shoes and her legs came out from under her and she landed on her bottom.  She states she did not hit her abdomen.  However, over the course of the day she has been experiencing some lower abd pain that is sharp and worse with movement.     Pt placed on monitor x2.  Complete hx reviewed.  POC discussed with pt and S.O. Verb understanding.

## 2019-02-02 LAB
BACTERIA SPEC AEROBE CULT: NORMAL
BACTERIA UR CULT: NORMAL

## 2019-02-07 ENCOUNTER — ROUTINE PRENATAL (OUTPATIENT)
Dept: OBSTETRICS AND GYNECOLOGY | Facility: CLINIC | Age: 29
End: 2019-02-07
Payer: MEDICAID

## 2019-02-07 VITALS
BODY MASS INDEX: 37.93 KG/M2 | DIASTOLIC BLOOD PRESSURE: 62 MMHG | SYSTOLIC BLOOD PRESSURE: 104 MMHG | WEIGHT: 200.75 LBS

## 2019-02-07 DIAGNOSIS — O99.013 ANEMIA DURING PREGNANCY IN THIRD TRIMESTER: Primary | ICD-10-CM

## 2019-02-07 DIAGNOSIS — R82.998 LEUKOCYTES IN URINE: ICD-10-CM

## 2019-02-07 DIAGNOSIS — Z34.90 ENCOUNTER FOR SUPERVISION OF NORMAL PREGNANCY, ANTEPARTUM, UNSPECIFIED GRAVIDITY: ICD-10-CM

## 2019-02-07 PROCEDURE — 87077 CULTURE AEROBIC IDENTIFY: CPT

## 2019-02-07 PROCEDURE — 99999 PR PBB SHADOW E&M-EST. PATIENT-LVL III: ICD-10-PCS | Mod: PBBFAC,,, | Performed by: OBSTETRICS & GYNECOLOGY

## 2019-02-07 PROCEDURE — 99213 OFFICE O/P EST LOW 20 MIN: CPT | Mod: PBBFAC,TH | Performed by: OBSTETRICS & GYNECOLOGY

## 2019-02-07 PROCEDURE — 87088 URINE BACTERIA CULTURE: CPT

## 2019-02-07 PROCEDURE — 99214 PR OFFICE/OUTPT VISIT, EST, LEVL IV, 30-39 MIN: ICD-10-PCS | Mod: TH,S$PBB,, | Performed by: OBSTETRICS & GYNECOLOGY

## 2019-02-07 PROCEDURE — 99999 PR PBB SHADOW E&M-EST. PATIENT-LVL III: CPT | Mod: PBBFAC,,, | Performed by: OBSTETRICS & GYNECOLOGY

## 2019-02-07 PROCEDURE — 87086 URINE CULTURE/COLONY COUNT: CPT

## 2019-02-07 PROCEDURE — 99214 OFFICE O/P EST MOD 30 MIN: CPT | Mod: TH,S$PBB,, | Performed by: OBSTETRICS & GYNECOLOGY

## 2019-02-07 PROCEDURE — 87186 SC STD MICRODIL/AGAR DIL: CPT

## 2019-02-07 RX ORDER — METRONIDAZOLE 500 MG/1
TABLET ORAL
Refills: 0 | COMMUNITY
Start: 2019-01-31 | End: 2019-02-22 | Stop reason: SDUPTHER

## 2019-02-07 RX ORDER — FERROUS SULFATE 325(65) MG
325 TABLET, DELAYED RELEASE (ENTERIC COATED) ORAL 2 TIMES DAILY
Qty: 60 TABLET | Refills: 3 | Status: SHIPPED | OUTPATIENT
Start: 2019-02-07 | End: 2019-03-27

## 2019-02-07 NOTE — PROGRESS NOTES
Complaints today: Ms. Pendleton is doing well. She reports that she has been having contractions overnight last night. Normal fetal movements with no vaginal bleeding, LOF at this time.       /62   Wt 91 kg (200 lb 11.7 oz)   LMP 2018   BMI 37.93 kg/m²     28 y.o., at 36w2d by Estimated Date of Delivery: 3/5/19  Patient Active Problem List   Diagnosis    Encounter for supervision of normal pregnancy, antepartum    Late prenatal care    HSIL (high grade squamous intraepithelial lesion) on Pap smear of cervix    Dysplasia of cervix, high grade HELENE 2    Encounter for fetal anatomic survey    Abdominal pain     OB History    Para Term  AB Living   4 3 3     3   SAB TAB Ectopic Multiple Live Births           1      # Outcome Date GA Lbr Jules/2nd Weight Sex Delivery Anes PTL Lv   4 Current            3 Term 02/15/15 40w0d   F Vag-Spont  N    2 Term 12 40w0d   F Vag-Spont  N JULY   1 Term 11/01/10 37w0d   M Vag-Spont             Dating reviewed    Allergies and problem list reviewed and updated    Medical and surgical history reviewed    Prenatal labs reviewed and updated    Physical Exam:  ABD: soft, gravid, nontender, 37 cm    Assessment:  Anila was seen today for routine prenatal visit.    Diagnoses and all orders for this visit:    Anemia during pregnancy in third trimester  -     ferrous sulfate 325 (65 FE) MG EC tablet; Take 1 tablet (325 mg total) by mouth 2 (two) times daily.    Leukocytes in urine  -     Urine culture    Encounter for supervision of normal pregnancy, antepartum, unspecified   - Bleeding/pain, kick counts, labor precautions given      Orders Placed This Encounter   Procedures    Urine culture       Follow-up in about 1 week (around 2019) for routine OB.

## 2019-02-07 NOTE — PATIENT INSTRUCTIONS
Labor and Childbirth: Active Labor  During active labor, your contractions will be stronger and more rhythmic than with early labor. They peak and subside like waves. They may happen 3 to 5 minutes apart and last about 45 to 60 seconds. This part of labor can be hard work. But it is often shorter than early labor. When you reach active labor, exams and tests will be done to see how you and your baby are doing.     Your cervix may dilate 4 to 8 centimeters during the first part of active labor.   Evaluating you and your baby  An exam tells how you and your baby are responding to contractions. Your blood pressure, temperature, and pulse will be checked. A blood or urine sample may also be taken. A fetal monitor will be used to check your babys heart rate. Sometimes an IV (intravenous) line is started to give you medicine and fluids.  Moving ahead with labor  You may now feel contractions in your whole stomach instead of just the lower part (like during early labor). If your amniotic sac has not broken already, it may break now. Or, it may be broken for you. To help your baby descend, change position often. Walking or sitting in a rocking chair or recliner may help. You may find it hard to relax even though you are tired. You may also be less interested in talking than you were earlier. If youre having anesthesia, you will get it now.   Special issues during labor  If labor doesnt progress well or a problem arises, you may need a . But your healthcare providers may take certain steps to help you avoid a :  · If your cervix isnt dilating, a medicine (oxytocin) may be used to augment labor.  · If fetal monitoring shows your baby isnt getting enough oxygen, shifting your body position may help. You may also be given oxygen through a mask.  · If you have preeclampsia (a condition that results in high blood pressure, swelling, and other symptoms), you may be given medicines by IV (intravenous). Your  healthcare provider may also tell you to lie on your left side.  Responding to contractions  During contractions, try to stay relaxed. Tense muscles use more oxygen, eat up your bodys energy, and increase pain. Use the breathing and relaxation techniques you may have learned. And let your support person know how he or she can help. If youve had problems during a previous birth, focus on the present. Keep in mind that no 2 births are the same.     Support persons note  Here's how you can help:  · Have the mother walk or change positions at least once an hour. This improves circulation and helps the baby descend.  · Keep reminding the mother to breathe and relax through each contraction.  · Reassure her. Try to keep her from getting anxious or overstressed.  · Take care of yourself. Take a short break to eat or go to the bathroom when you need to.  · Rest when the mother does. Youll both benefit.   Date Last Reviewed: 8/16/2015  © 1136-3933 [employer] [place]. [address] [place] [address], [place] [address]. All rights reserved. This information is not intended as a substitute for professional medical care. Always follow your healthcare professional's instructions.

## 2019-02-09 LAB — BACTERIA UR CULT: NORMAL

## 2019-02-11 DIAGNOSIS — O23.43 URINARY TRACT INFECTION IN MOTHER DURING THIRD TRIMESTER OF PREGNANCY: Primary | ICD-10-CM

## 2019-02-11 RX ORDER — AMOXICILLIN AND CLAVULANATE POTASSIUM 875; 125 MG/1; MG/1
1 TABLET, FILM COATED ORAL 2 TIMES DAILY
Qty: 10 TABLET | Refills: 0 | Status: SHIPPED | OUTPATIENT
Start: 2019-02-11 | End: 2019-02-16

## 2019-02-14 ENCOUNTER — ROUTINE PRENATAL (OUTPATIENT)
Dept: OBSTETRICS AND GYNECOLOGY | Facility: CLINIC | Age: 29
End: 2019-02-14
Payer: MEDICAID

## 2019-02-14 VITALS
BODY MASS INDEX: 37.93 KG/M2 | WEIGHT: 200.75 LBS | DIASTOLIC BLOOD PRESSURE: 64 MMHG | SYSTOLIC BLOOD PRESSURE: 102 MMHG

## 2019-02-14 DIAGNOSIS — Z34.90 ENCOUNTER FOR SUPERVISION OF NORMAL PREGNANCY, ANTEPARTUM, UNSPECIFIED GRAVIDITY: Primary | ICD-10-CM

## 2019-02-14 DIAGNOSIS — N89.8 VAGINAL DISCHARGE: ICD-10-CM

## 2019-02-14 PROCEDURE — 87510 GARDNER VAG DNA DIR PROBE: CPT

## 2019-02-14 PROCEDURE — 99999 PR PBB SHADOW E&M-EST. PATIENT-LVL III: ICD-10-PCS | Mod: PBBFAC,,, | Performed by: OBSTETRICS & GYNECOLOGY

## 2019-02-14 PROCEDURE — 99213 OFFICE O/P EST LOW 20 MIN: CPT | Mod: PBBFAC,TH | Performed by: OBSTETRICS & GYNECOLOGY

## 2019-02-14 PROCEDURE — 99999 PR PBB SHADOW E&M-EST. PATIENT-LVL III: CPT | Mod: PBBFAC,,, | Performed by: OBSTETRICS & GYNECOLOGY

## 2019-02-14 PROCEDURE — 87491 CHLMYD TRACH DNA AMP PROBE: CPT

## 2019-02-14 PROCEDURE — 99214 OFFICE O/P EST MOD 30 MIN: CPT | Mod: TH,S$PBB,, | Performed by: OBSTETRICS & GYNECOLOGY

## 2019-02-14 PROCEDURE — 99214 PR OFFICE/OUTPT VISIT, EST, LEVL IV, 30-39 MIN: ICD-10-PCS | Mod: TH,S$PBB,, | Performed by: OBSTETRICS & GYNECOLOGY

## 2019-02-14 PROCEDURE — 87480 CANDIDA DNA DIR PROBE: CPT

## 2019-02-14 NOTE — PROGRESS NOTES
Complaints today: Ms. Pendleton is doing well. She reports pelvic pain but reports that Cesar Chawla contractions have improved. She also c/o vaginal discharge. She is currently on antibiotics for UTI. Normal fetal movements with no vaginal bleeding, LOF at this time.     /64   Wt 91 kg (200 lb 11.7 oz)   LMP 2018   BMI 37.93 kg/m²     28 y.o., at 37w2d by Estimated Date of Delivery: 3/5/19  Patient Active Problem List   Diagnosis    Encounter for supervision of normal pregnancy, antepartum    Late prenatal care    HSIL (high grade squamous intraepithelial lesion) on Pap smear of cervix    Dysplasia of cervix, high grade HELENE 2    Encounter for fetal anatomic survey    Abdominal pain     OB History    Para Term  AB Living   4 3 3     3   SAB TAB Ectopic Multiple Live Births           1      # Outcome Date GA Lbr Jules/2nd Weight Sex Delivery Anes PTL Lv   4 Current            3 Term 02/15/15 40w0d   F Vag-Spont  N    2 Term 12 40w0d   F Vag-Spont  N JULY   1 Term 11/01/10 37w0d   M Vag-Spont             Dating reviewed    Allergies and problem list reviewed and updated    Medical and surgical history reviewed    Prenatal labs reviewed and updated    Physical Exam:  ABD: soft, gravid, nontender, 38cm  : thick yellow- green discharge     Assessment:  Anila was seen today for routine prenatal visit.    Diagnoses and all orders for this visit:    Vaginal discharge  -     Vaginosis Screen by DNA Probe    Encounter for supervision of normal pregnancy, antepartum, unspecified   - GBS, Consents, labs done  - Bleeding/pain, kick counts, labor precautions given          Orders Placed This Encounter   Procedures    Vaginosis Screen by DNA Probe    C. trachomatis/N. gonorrhoeae by AMP DNA       Follow-up in about 1 week (around 2019) for routine OB.

## 2019-02-14 NOTE — PATIENT INSTRUCTIONS
Recognizing Labor    The beginning of labor is the beginning of birth. Youll start to feel strong contractions. Thats when the muscles of your uterus tighten up to help push your baby out during birth.  Yes, labor has probably started   Signs of labor include:  · Your contractions are getting stronger and more painful instead of weaker. Youll probably feel them throughout your whole uterus.  · Your contractions are regular. This means that you feel them about every 5 to 10 minutes. And they are getting closer together.  · You have pink-colored or blood-streaked fluid from your vagina.  · Your water breaks. It may be a gush or a slow trickle of clear fluid from your vagina.  No, its probably not real labor   Signs of false labor include:  · Your contractions arent regular or strong.  · You feel the contractions only in your lower uterus.  · Your contractions go away when you walk or change position.  · Your contractions go away after drinking fluids.  When to call your healthcare provider  Call your healthcare provider or clinic right away if you notice any of these signs:  · Fluid from your vagina, with or without contractions.  · Bleeding heavy enough that you need a sanitary pad.  · You dont feel your baby moving as much as before.     NOTE: Contractions are timed by both of these measures:  · The length of each contraction from its start to its finish.  · How far apart the contractions are -- the time between the start of one contraction and the start of the next contraction.   Date Last Reviewed: 8/9/2015  © 0901-8941 [employer]. [address] [place] [address], [place] [address]. All rights reserved. This information is not intended as a substitute for professional medical care. Always follow your healthcare professional's instructions.      Hospital Bag for Mom: Labor and Delivery  Hospital paperwork, ID, and insurance card. Have copies of your medical records handy, so that your healthcare  providers can easily review your medical history. Hospitals require your ID, any medical cards, and insurance documents up front, so make sure you have a copy of these readily available.   Birth plan (if you have one). You might have discussed your birth plan with your medical team, but having a few copies printed and available for your healthcare providers means that everyone can refer to it in case last-minute questions arise. Wondering what to include in your birth plan? Download our birth plan template for inspiration.   Bathrobe. A soft bathrobe is useful for pacing around during labor, or afterward, if you spend some time in the hospital.   Socks. Your feet may get cold during labor.   Slippers and flip-flops. Youll want slippers that are comfortable and easy to slip in and out of to wear as you walk around the hospital [place]. Pack some flip-flops for using in the shower.  Lip balm. Your lips can get chapped during labor. Having some lip balm on hand will help keep your lips hydrated and comfortable.   Body lotion or massage oil. Some moms-to-be find a little massage during labor relaxing. If this could be you, pop some lotion or oil in your hospital bag.   Water spray and sponge. During labor, if you start to feel hot, it can help to spray some water on your face and neck, or to sponge some cool water on your forehead.   Comfortable pillow(s). Your hospital will provide you with pillows, but they might not be the right kind for you. If you have a favorite pillow at home, then you may want to bring it along as well.   Relaxing entertainment. Pack some things to help you pass the time like a book, magazines, a tablet with movies or series downloaded on it, or a music player.   Eye mask and earplugs. To help you get rest in a busy and bright maternity [place], an eye mask and earplugs could be just what you need during the downtimes of labor, or for your well-deserved shuteye after the delivery.      Hospital Bag  for Mom: After Delivery  Nightgowns. Youll need something comfortable to sleep in during your hospital stay, and a soft, loose nightgown is a good option. Choose a front-opening style if you plan to breastfeed.   Heavy-duty maternity pads. The hospital will provide some of these, but you may want to pack a few heavy-duty maternity pads, just in case. Its normal to bleed a lot after the birth, and maternity pads are softer and more absorbent than standard pads. Initially you may need to change pads every one to two hours, but within a few days, the flow will start to decrease.   Underwear. Pack several pairs of comfortable underwear that are large enough to wear over heavy-duty maternity pads.   Bras. Be prepared with a few nursing bras or other comfortable, well-fitting bras.   Toiletries. Dont forget tissues, hairbrush, comb, deodorant, toothbrush, toothpaste, shampoo, conditioner, hairdryer, hair clips, and hair ties. Pack a plastic bag to pop dirty clothes in.   Cosmetics and skin care products. If makeup is part of your usual routine, then dont forget your cosmetics. Plus, make sure you pack some moisturizer, as your skin may feel drier than usual.   Glasses and contact lenses (if you need them). It may seem obvious, but sometimes its these little things that can escape your attention when packing your hospital bag. Dont forget contact lens solution and a lens case if you use contact lenses.   Phone and . Unless you opt for a little digital detox during this special time, dont forget your phone and . You can stay in touch with loved ones, use it to take those first few pictures, and post your special news on social media.   Clothes. Aside from your nightgown, you might choose to take some comfortable clothes to wear during your hospital stay. Pack an extra outfit to wear home. Choose something loose-fitting, with a drawstring or elastic waist.   Handouts and reference books. You might have  received some handy notes from your prenatal classes or have some reference books about newborns. The doctors and nurses will be able to give you lots of personalized guidance, but you might find these resources more useful once you actually have your  in your arms.   Snacks and drinks. Labor can sometimes be very long, so you could consider packing some snacks and drinks. However, speak to your medical team about whether or not you will be allowed to eat or drink anything during labor. Also, consider packing some of your favorite snacks for after labor as you may feel like some comfort food during your hospital stay.      Hospital Bag for Your Baby  Bodysuits. Hospital policies can vary on what newborns can be dressed in so consult with your healthcare provider in advance about what to pack. You may need to add to what the hospital provides in terms of accessories and layers. Remember, with bodysuits its a good idea to choose those that fasten up at the front.   Socks and booties. Newborns can get cold easily so take some socks and booties just in case. Even during skin-to-skin contact, your  can wear a hat and socks.   [place]. The hospital will likely provide blankets, but a blanket of your own is always good to have on hand to use during skin-to-skin contact. It can also be used to keep your baby warm in the car seat on the way home.   Going-home outfit. Consider the weather conditions: a bodysuit, booties, and hat could be fine during the warmer months, but in winter, pack mittens and a jacket or snowsuit, as well.   Car seat. This obviously isnt for the hospital bag, but the right car seat should be installed in your car around the same time you pack your baby bag so its ready for the hospital.     Hospital Bag Essentials for the Birth Partner  As a birth partner you might also want to pack some things for your time supporting mom in the hospital:  Snacks and water. Labor can be thirsty work  even for supportive partners. Consider packing some snacks and water, as well as change for the hospital vending machines.   Phone, camera and/or video camera, plus chargers and batteries. Dont forget to pack a phone to stay in contact with loved ones, and for some entertainment during downtimes. The camera will come in handy to take some happy snaps. (Make sure the cameras memory card has plenty of free space on it.)   Clothes. Labor is an unpredictable process, so a change of clothes is always a good idea, as you never know how long the stay will be.   Toiletries. After a long labor, you might need to freshen up in the shower. Most hospitals are fine with this, but you can confirm this beforehand.   Spare glasses or spare contact lenses. It might be a long day, so having spares of these essentials could come in handy.   Small pillow. You might appreciate getting a bit of rest during downtimes, as well.   Entertainment. Something to do: books, a tablet, and a music player are all good options.        With this hospital bag checklist, youll be well prepared for your time in the hospital. Read up on the signs of labor, which includes things like your water breaking or seeing the mucus plug discharge. Another way to check whether you are in labor is to time your contractions to see if they are getting stronger, longer, and more frequent. Contact your healthcare provider if you notice any of the signs of labor ? theyll be able to let you know when its time to grab your hospital bag and be on your way. Good luck!

## 2019-02-17 ENCOUNTER — PATIENT MESSAGE (OUTPATIENT)
Dept: OBSTETRICS AND GYNECOLOGY | Facility: CLINIC | Age: 29
End: 2019-02-17

## 2019-02-17 LAB
C TRACH DNA SPEC QL NAA+PROBE: NOT DETECTED
N GONORRHOEA DNA SPEC QL NAA+PROBE: NOT DETECTED

## 2019-02-22 ENCOUNTER — PATIENT MESSAGE (OUTPATIENT)
Dept: OBSTETRICS AND GYNECOLOGY | Facility: CLINIC | Age: 29
End: 2019-02-22

## 2019-02-22 ENCOUNTER — ROUTINE PRENATAL (OUTPATIENT)
Dept: OBSTETRICS AND GYNECOLOGY | Facility: CLINIC | Age: 29
End: 2019-02-22
Payer: MEDICAID

## 2019-02-22 VITALS
WEIGHT: 202.38 LBS | BODY MASS INDEX: 38.24 KG/M2 | DIASTOLIC BLOOD PRESSURE: 68 MMHG | SYSTOLIC BLOOD PRESSURE: 104 MMHG

## 2019-02-22 DIAGNOSIS — Z34.90 ENCOUNTER FOR SUPERVISION OF NORMAL PREGNANCY, ANTEPARTUM, UNSPECIFIED GRAVIDITY: Primary | ICD-10-CM

## 2019-02-22 DIAGNOSIS — A59.9 TRICHOMONAL INFECTION: ICD-10-CM

## 2019-02-22 PROCEDURE — 99213 OFFICE O/P EST LOW 20 MIN: CPT | Mod: PBBFAC,TH | Performed by: OBSTETRICS & GYNECOLOGY

## 2019-02-22 PROCEDURE — 99999 PR PBB SHADOW E&M-EST. PATIENT-LVL III: ICD-10-PCS | Mod: PBBFAC,,, | Performed by: OBSTETRICS & GYNECOLOGY

## 2019-02-22 PROCEDURE — 99214 PR OFFICE/OUTPT VISIT, EST, LEVL IV, 30-39 MIN: ICD-10-PCS | Mod: TH,S$PBB,, | Performed by: OBSTETRICS & GYNECOLOGY

## 2019-02-22 PROCEDURE — 99214 OFFICE O/P EST MOD 30 MIN: CPT | Mod: TH,S$PBB,, | Performed by: OBSTETRICS & GYNECOLOGY

## 2019-02-22 PROCEDURE — 99999 PR PBB SHADOW E&M-EST. PATIENT-LVL III: CPT | Mod: PBBFAC,,, | Performed by: OBSTETRICS & GYNECOLOGY

## 2019-02-22 RX ORDER — AZITHROMYCIN 500 MG/1
1000 TABLET, FILM COATED ORAL DAILY
Qty: 2 TABLET | Refills: 0 | Status: SHIPPED | OUTPATIENT
Start: 2019-02-22 | End: 2019-02-22

## 2019-02-22 RX ORDER — METRONIDAZOLE 500 MG/1
2000 TABLET ORAL ONCE
Qty: 4 TABLET | Refills: 0 | Status: SHIPPED | OUTPATIENT
Start: 2019-02-22 | End: 2019-02-22

## 2019-02-22 RX ORDER — AZITHROMYCIN 500 MG/1
1000 TABLET, FILM COATED ORAL DAILY
Qty: 2 TABLET | Refills: 1 | Status: SHIPPED | OUTPATIENT
Start: 2019-02-22 | End: 2019-02-22 | Stop reason: CLARIF

## 2019-02-22 NOTE — PATIENT INSTRUCTIONS
Labor and Childbirth: Active Labor  During active labor, your contractions will be stronger and more rhythmic than with early labor. They peak and subside like waves. They may happen 3 to 5 minutes apart and last about 45 to 60 seconds. This part of labor can be hard work. But it is often shorter than early labor. When you reach active labor, exams and tests will be done to see how you and your baby are doing.     Your cervix may dilate 4 to 8 centimeters during the first part of active labor.   Evaluating you and your baby  An exam tells how you and your baby are responding to contractions. Your blood pressure, temperature, and pulse will be checked. A blood or urine sample may also be taken. A fetal monitor will be used to check your babys heart rate. Sometimes an IV (intravenous) line is started to give you medicine and fluids.  Moving ahead with labor  You may now feel contractions in your whole stomach instead of just the lower part (like during early labor). If your amniotic sac has not broken already, it may break now. Or, it may be broken for you. To help your baby descend, change position often. Walking or sitting in a rocking chair or recliner may help. You may find it hard to relax even though you are tired. You may also be less interested in talking than you were earlier. If youre having anesthesia, you will get it now.   Special issues during labor  If labor doesnt progress well or a problem arises, you may need a . But your healthcare providers may take certain steps to help you avoid a :  · If your cervix isnt dilating, a medicine (oxytocin) may be used to augment labor.  · If fetal monitoring shows your baby isnt getting enough oxygen, shifting your body position may help. You may also be given oxygen through a mask.  · If you have preeclampsia (a condition that results in high blood pressure, swelling, and other symptoms), you may be given medicines by IV (intravenous). Your  healthcare provider may also tell you to lie on your left side.  Responding to contractions  During contractions, try to stay relaxed. Tense muscles use more oxygen, eat up your bodys energy, and increase pain. Use the breathing and relaxation techniques you may have learned. And let your support person know how he or she can help. If youve had problems during a previous birth, focus on the present. Keep in mind that no 2 births are the same.     Support persons note  Here's how you can help:  · Have the mother walk or change positions at least once an hour. This improves circulation and helps the baby descend.  · Keep reminding the mother to breathe and relax through each contraction.  · Reassure her. Try to keep her from getting anxious or overstressed.  · Take care of yourself. Take a short break to eat or go to the bathroom when you need to.  · Rest when the mother does. Youll both benefit.   Date Last Reviewed: 8/16/2015  © 1232-5012 [employer]. [address] [place] [address], [place] [address]. All rights reserved. This information is not intended as a substitute for professional medical care. Always follow your healthcare professional's instructions.        Hospital Bag for Mom: Labor and Delivery  Hospital paperwork, ID, and insurance card. Have copies of your medical records handy, so that your healthcare providers can easily review your medical history. Hospitals require your ID, any medical cards, and insurance documents up front, so make sure you have a copy of these readily available.   Birth plan (if you have one). You might have discussed your birth plan with your medical team, but having a few copies printed and available for your healthcare providers means that everyone can refer to it in case last-minute questions arise. Wondering what to include in your birth plan? Download our birth plan template for inspiration.   Bathrobe. A soft bathrobe is useful for pacing around during labor, or  afterward, if you spend some time in the hospital.   Socks. Your feet may get cold during labor.   Slippers and flip-flops. Youll want slippers that are comfortable and easy to slip in and out of to wear as you walk around the hospital [place]. Pack some flip-flops for using in the shower.  Lip balm. Your lips can get chapped during labor. Having some lip balm on hand will help keep your lips hydrated and comfortable.   Body lotion or massage oil. Some moms-to-be find a little massage during labor relaxing. If this could be you, pop some lotion or oil in your hospital bag.   Water spray and sponge. During labor, if you start to feel hot, it can help to spray some water on your face and neck, or to sponge some cool water on your forehead.   Comfortable pillow(s). Your hospital will provide you with pillows, but they might not be the right kind for you. If you have a favorite pillow at home, then you may want to bring it along as well.   Relaxing entertainment. Pack some things to help you pass the time like a book, magazines, a tablet with movies or series downloaded on it, or a music player.   Eye mask and earplugs. To help you get rest in a busy and bright maternity [place], an eye mask and earplugs could be just what you need during the downtimes of labor, or for your well-deserved shuteye after the delivery.      Hospital Bag for Mom: After Delivery  Nightgowns. Youll need something comfortable to sleep in during your hospital stay, and a soft, loose nightgown is a good option. Choose a front-opening style if you plan to breastfeed.   Heavy-duty maternity pads. The hospital will provide some of these, but you may want to pack a few heavy-duty maternity pads, just in case. Its normal to bleed a lot after the birth, and maternity pads are softer and more absorbent than standard pads. Initially you may need to change pads every one to two hours, but within a few days, the flow will start to decrease.   Underwear. Pack  several pairs of comfortable underwear that are large enough to wear over heavy-duty maternity pads.   Bras. Be prepared with a few nursing bras or other comfortable, well-fitting bras.   Toiletries. Dont forget tissues, hairbrush, comb, deodorant, toothbrush, toothpaste, shampoo, conditioner, hairdryer, hair clips, and hair ties. Pack a plastic bag to pop dirty clothes in.   Cosmetics and skin care products. If makeup is part of your usual routine, then dont forget your cosmetics. Plus, make sure you pack some moisturizer, as your skin may feel drier than usual.   Glasses and contact lenses (if you need them). It may seem obvious, but sometimes its these little things that can escape your attention when packing your hospital bag. Dont forget contact lens solution and a lens case if you use contact lenses.   Phone and . Unless you opt for a little digital detox during this special time, dont forget your phone and . You can stay in touch with loved ones, use it to take those first few pictures, and post your special news on social media.   Clothes. Aside from your nightgown, you might choose to take some comfortable clothes to wear during your hospital stay. Pack an extra outfit to wear home. Choose something loose-fitting, with a drawstring or elastic waist.   Handouts and reference books. You might have received some handy notes from your prenatal classes or have some reference books about newborns. The doctors and nurses will be able to give you lots of personalized guidance, but you might find these resources more useful once you actually have your  in your arms.   Snacks and drinks. Labor can sometimes be very long, so you could consider packing some snacks and drinks. However, speak to your medical team about whether or not you will be allowed to eat or drink anything during labor. Also, consider packing some of your favorite snacks for after labor as you may feel like some comfort  food during your hospital stay.      Hospital Bag for Your Baby  Bodysuits. Hospital policies can vary on what newborns can be dressed in so consult with your healthcare provider in advance about what to pack. You may need to add to what the hospital provides in terms of accessories and layers. Remember, with bodysuits its a good idea to choose those that fasten up at the front.   Socks and booties. Newborns can get cold easily so take some socks and booties just in case. Even during skin-to-skin contact, your  can wear a hat and socks.   [place]. The hospital will likely provide blankets, but a blanket of your own is always good to have on hand to use during skin-to-skin contact. It can also be used to keep your baby warm in the car seat on the way home.   Going-home outfit. Consider the weather conditions: a bodysuit, booties, and hat could be fine during the warmer months, but in winter, pack mittens and a jacket or snowsuit, as well.   Car seat. This obviously isnt for the hospital bag, but the right car seat should be installed in your car around the same time you pack your baby bag so its ready for the hospital.     Hospital Bag Essentials for the Birth Partner  As a birth partner you might also want to pack some things for your time supporting mom in the hospital:  Snacks and water. Labor can be thirsty work even for supportive partners. Consider packing some snacks and water, as well as change for the hospital vending machines.   Phone, camera and/or video camera, plus chargers and batteries. Dont forget to pack a phone to stay in contact with loved ones, and for some entertainment during downtimes. The camera will come in handy to take some happy snaps. (Make sure the cameras memory card has plenty of free space on it.)   Clothes. Labor is an unpredictable process, so a change of clothes is always a good idea, as you never know how long the stay will be.   Toiletries. After a long labor, you  might need to freshen up in the shower. Most hospitals are fine with this, but you can confirm this beforehand.   Spare glasses or spare contact lenses. It might be a long day, so having spares of these essentials could come in handy.   Small pillow. You might appreciate getting a bit of rest during downtimes, as well.   Entertainment. Something to do: books, a tablet, and a music player are all good options.        With this hospital bag checklist, youll be well prepared for your time in the hospital. Read up on the signs of labor, which includes things like your water breaking or seeing the mucus plug discharge. Another way to check whether you are in labor is to time your contractions to see if they are getting stronger, longer, and more frequent. Contact your healthcare provider if you notice any of the signs of labor ? theyll be able to let you know when its time to grab your hospital bag and be on your way. Good luck!

## 2019-02-22 NOTE — PROGRESS NOTES
Complaints today: Ms. Pendleton is doing well. She reports that she has better movement since her fall. She report vaginal discharge. She never picked up Flagyl for Trich infection since last appointment. She reports that she also has not picked up iron pills.  Normal fetal movements with no vaginal bleeding, LOF or contractions at this time.       /68   Wt 91.8 kg (202 lb 6.1 oz)   LMP 2018   BMI 38.24 kg/m²     28 y.o., at 38w3d by Estimated Date of Delivery: 3/5/19  Patient Active Problem List   Diagnosis    Encounter for supervision of normal pregnancy, antepartum    Late prenatal care    HSIL (high grade squamous intraepithelial lesion) on Pap smear of cervix    Dysplasia of cervix, high grade HELENE 2    Encounter for fetal anatomic survey    Abdominal pain    Trichomonal infection     OB History    Para Term  AB Living   4 3 3     3   SAB TAB Ectopic Multiple Live Births           1      # Outcome Date GA Lbr Jules/2nd Weight Sex Delivery Anes PTL Lv   4 Current            3 Term 02/15/15 40w0d   F Vag-Spont  N    2 Term 12 40w0d   F Vag-Spont  N JULY   1 Term 11/01/10 37w0d   M Vag-Spont             Dating reviewed    Allergies and problem list reviewed and updated    Medical and surgical history reviewed    Prenatal labs reviewed and updated    Physical Exam:  ABD: soft, gravid, nontender, 39    Assessment:  Anila was seen today for routine prenatal visit.    Diagnoses and all orders for this visit:    Encounter for supervision of normal pregnancy, antepartum, unspecified   - Doing well  - All 3rd trimester labs reviewed  - Induction date set for 2019    Trichomonal infection  -     metroNIDAZOLE (FLAGYL) 500 MG tablet; Take 4 tablets (2,000 mg total) by mouth once. for 1 dose  - Abstain from intercourse until partner treated    No orders of the defined types were placed in this encounter.      Follow-up in about 4 days (around 2019) for  Induction.

## 2019-02-25 DIAGNOSIS — Z34.90 ENCOUNTER FOR ELECTIVE INDUCTION OF LABOR: Primary | ICD-10-CM

## 2019-02-25 RX ORDER — SODIUM CHLORIDE, SODIUM LACTATE, POTASSIUM CHLORIDE, CALCIUM CHLORIDE 600; 310; 30; 20 MG/100ML; MG/100ML; MG/100ML; MG/100ML
INJECTION, SOLUTION INTRAVENOUS CONTINUOUS
Status: CANCELLED | OUTPATIENT
Start: 2019-02-25

## 2019-02-25 RX ORDER — BUTORPHANOL TARTRATE 1 MG/ML
1 INJECTION INTRAMUSCULAR; INTRAVENOUS
Status: CANCELLED | OUTPATIENT
Start: 2019-02-25

## 2019-02-25 RX ORDER — ONDANSETRON 4 MG/1
8 TABLET, ORALLY DISINTEGRATING ORAL EVERY 8 HOURS PRN
Status: CANCELLED | OUTPATIENT
Start: 2019-02-25

## 2019-02-25 RX ORDER — SODIUM CHLORIDE 9 MG/ML
INJECTION, SOLUTION INTRAVENOUS
Status: CANCELLED | OUTPATIENT
Start: 2019-02-25

## 2019-02-25 RX ORDER — CARBOPROST TROMETHAMINE 250 UG/ML
250 INJECTION, SOLUTION INTRAMUSCULAR
Status: CANCELLED | OUTPATIENT
Start: 2019-02-25

## 2019-02-25 RX ORDER — METHYLERGONOVINE MALEATE 0.2 MG/ML
200 INJECTION INTRAVENOUS
Status: CANCELLED | OUTPATIENT
Start: 2019-02-25

## 2019-02-25 RX ORDER — MISOPROSTOL 100 UG/1
800 TABLET ORAL ONCE
Status: CANCELLED | OUTPATIENT
Start: 2019-02-25

## 2019-02-26 ENCOUNTER — ANESTHESIA EVENT (OUTPATIENT)
Dept: OBSTETRICS AND GYNECOLOGY | Facility: HOSPITAL | Age: 29
End: 2019-02-26
Payer: MEDICAID

## 2019-02-26 ENCOUNTER — ANESTHESIA (OUTPATIENT)
Dept: OBSTETRICS AND GYNECOLOGY | Facility: HOSPITAL | Age: 29
End: 2019-02-26
Payer: MEDICAID

## 2019-02-26 ENCOUNTER — HOSPITAL ENCOUNTER (INPATIENT)
Facility: HOSPITAL | Age: 29
LOS: 2 days | Discharge: HOME | End: 2019-02-28
Attending: OBSTETRICS & GYNECOLOGY | Admitting: OBSTETRICS & GYNECOLOGY
Payer: MEDICAID

## 2019-02-26 DIAGNOSIS — Z34.90 ENCOUNTER FOR ELECTIVE INDUCTION OF LABOR: ICD-10-CM

## 2019-02-26 PROBLEM — R10.9 ABDOMINAL PAIN: Status: RESOLVED | Noted: 2019-01-30 | Resolved: 2019-02-26

## 2019-02-26 PROBLEM — O09.30 LATE PRENATAL CARE: Status: RESOLVED | Noted: 2018-09-20 | Resolved: 2019-02-26

## 2019-02-26 LAB
ABO + RH BLD: NORMAL
BASOPHILS # BLD AUTO: 0 K/UL
BASOPHILS NFR BLD: 0 %
BLD GP AB SCN CELLS X3 SERPL QL: NORMAL
DIFFERENTIAL METHOD: ABNORMAL
EOSINOPHIL # BLD AUTO: 0 K/UL
EOSINOPHIL NFR BLD: 0.3 %
ERYTHROCYTE [DISTWIDTH] IN BLOOD BY AUTOMATED COUNT: 16 %
HCT VFR BLD AUTO: 36.2 %
HGB BLD-MCNC: 11 G/DL
LYMPHOCYTES # BLD AUTO: 1.9 K/UL
LYMPHOCYTES NFR BLD: 26.9 %
MCH RBC QN AUTO: 23.9 PG
MCHC RBC AUTO-ENTMCNC: 30.4 G/DL
MCV RBC AUTO: 79 FL
MONOCYTES # BLD AUTO: 0.8 K/UL
MONOCYTES NFR BLD: 10.4 %
NEUTROPHILS # BLD AUTO: 4.5 K/UL
NEUTROPHILS NFR BLD: 62.4 %
PLATELET # BLD AUTO: 271 K/UL
PMV BLD AUTO: 12 FL
RBC # BLD AUTO: 4.61 M/UL
WBC # BLD AUTO: 7.18 K/UL

## 2019-02-26 PROCEDURE — 25000003 PHARM REV CODE 250: Performed by: ANESTHESIOLOGY

## 2019-02-26 PROCEDURE — 25000003 PHARM REV CODE 250: Performed by: OBSTETRICS & GYNECOLOGY

## 2019-02-26 PROCEDURE — 72200006 HC VAGINAL DELIVERY LEVEL III

## 2019-02-26 PROCEDURE — S0020 INJECTION, BUPIVICAINE HYDRO: HCPCS | Performed by: ANESTHESIOLOGY

## 2019-02-26 PROCEDURE — 27200710 HC EPIDURAL INFUSION PUMP SET: Performed by: ANESTHESIOLOGY

## 2019-02-26 PROCEDURE — 86592 SYPHILIS TEST NON-TREP QUAL: CPT

## 2019-02-26 PROCEDURE — 59409 OBSTETRICAL CARE: CPT | Mod: AA,,, | Performed by: ANESTHESIOLOGY

## 2019-02-26 PROCEDURE — 62326 NJX INTERLAMINAR LMBR/SAC: CPT | Performed by: ANESTHESIOLOGY

## 2019-02-26 PROCEDURE — 72100002 HC LABOR CARE, 1ST 8 HOURS

## 2019-02-26 PROCEDURE — 59409 PRA ETRICAL CARE,VAG DELIV ONLY: ICD-10-PCS | Mod: AA,,, | Performed by: ANESTHESIOLOGY

## 2019-02-26 PROCEDURE — 85025 COMPLETE CBC W/AUTO DIFF WBC: CPT

## 2019-02-26 PROCEDURE — 63600175 PHARM REV CODE 636 W HCPCS: Performed by: OBSTETRICS & GYNECOLOGY

## 2019-02-26 PROCEDURE — 72200005 HC VAGINAL DELIVERY LEVEL II

## 2019-02-26 PROCEDURE — 59409 PR OBSTETRICAL CARE,VAG DELIV ONLY: ICD-10-PCS | Mod: GB,,, | Performed by: OBSTETRICS & GYNECOLOGY

## 2019-02-26 PROCEDURE — 59409 OBSTETRICAL CARE: CPT | Mod: GB,,, | Performed by: OBSTETRICS & GYNECOLOGY

## 2019-02-26 PROCEDURE — 86901 BLOOD TYPING SEROLOGIC RH(D): CPT

## 2019-02-26 PROCEDURE — 72100003 HC LABOR CARE, EA. ADDL. 8 HRS

## 2019-02-26 PROCEDURE — 11000001 HC ACUTE MED/SURG PRIVATE ROOM

## 2019-02-26 PROCEDURE — 51702 INSERT TEMP BLADDER CATH: CPT

## 2019-02-26 PROCEDURE — 36415 COLL VENOUS BLD VENIPUNCTURE: CPT

## 2019-02-26 PROCEDURE — 27800517 HC TRAY,EPIDURAL-CONTINUOUS: Performed by: ANESTHESIOLOGY

## 2019-02-26 RX ORDER — SODIUM CHLORIDE, SODIUM LACTATE, POTASSIUM CHLORIDE, CALCIUM CHLORIDE 600; 310; 30; 20 MG/100ML; MG/100ML; MG/100ML; MG/100ML
INJECTION, SOLUTION INTRAVENOUS CONTINUOUS
Status: DISCONTINUED | OUTPATIENT
Start: 2019-02-26 | End: 2019-02-26

## 2019-02-26 RX ORDER — ACETAMINOPHEN 325 MG/1
650 TABLET ORAL EVERY 6 HOURS PRN
Status: DISCONTINUED | OUTPATIENT
Start: 2019-02-26 | End: 2019-02-26

## 2019-02-26 RX ORDER — BUTORPHANOL TARTRATE 2 MG/ML
1 INJECTION INTRAMUSCULAR; INTRAVENOUS
Status: DISCONTINUED | OUTPATIENT
Start: 2019-02-26 | End: 2019-02-26

## 2019-02-26 RX ORDER — ONDANSETRON 8 MG/1
8 TABLET, ORALLY DISINTEGRATING ORAL EVERY 8 HOURS PRN
Status: DISCONTINUED | OUTPATIENT
Start: 2019-02-26 | End: 2019-02-26

## 2019-02-26 RX ORDER — OXYTOCIN/RINGER'S LACTATE 20/1000 ML
41.7 PLASTIC BAG, INJECTION (ML) INTRAVENOUS CONTINUOUS
Status: DISCONTINUED | OUTPATIENT
Start: 2019-02-26 | End: 2019-02-26

## 2019-02-26 RX ORDER — CARBOPROST TROMETHAMINE 250 UG/ML
250 INJECTION, SOLUTION INTRAMUSCULAR
Status: DISCONTINUED | OUTPATIENT
Start: 2019-02-26 | End: 2019-02-26

## 2019-02-26 RX ORDER — MEDROXYPROGESTERONE ACETATE 150 MG/ML
150 INJECTION, SUSPENSION INTRAMUSCULAR ONCE
Status: COMPLETED | OUTPATIENT
Start: 2019-02-26 | End: 2019-02-28

## 2019-02-26 RX ORDER — FERROUS SULFATE 325(65) MG
325 TABLET, DELAYED RELEASE (ENTERIC COATED) ORAL 2 TIMES DAILY
Status: DISCONTINUED | OUTPATIENT
Start: 2019-02-26 | End: 2019-02-28 | Stop reason: HOSPADM

## 2019-02-26 RX ORDER — MISOPROSTOL 200 UG/1
800 TABLET ORAL ONCE
Status: DISCONTINUED | OUTPATIENT
Start: 2019-02-26 | End: 2019-02-26

## 2019-02-26 RX ORDER — OXYCODONE AND ACETAMINOPHEN 5; 325 MG/1; MG/1
1 TABLET ORAL EVERY 4 HOURS PRN
Status: DISCONTINUED | OUTPATIENT
Start: 2019-02-26 | End: 2019-02-28 | Stop reason: HOSPADM

## 2019-02-26 RX ORDER — IBUPROFEN 600 MG/1
600 TABLET ORAL EVERY 6 HOURS PRN
Status: DISCONTINUED | OUTPATIENT
Start: 2019-02-26 | End: 2019-02-28 | Stop reason: HOSPADM

## 2019-02-26 RX ORDER — DIPHENHYDRAMINE HYDROCHLORIDE 50 MG/ML
25 INJECTION INTRAMUSCULAR; INTRAVENOUS EVERY 4 HOURS PRN
Status: DISCONTINUED | OUTPATIENT
Start: 2019-02-26 | End: 2019-02-28 | Stop reason: HOSPADM

## 2019-02-26 RX ORDER — OXYTOCIN/RINGER'S LACTATE 20/1000 ML
41.65 PLASTIC BAG, INJECTION (ML) INTRAVENOUS CONTINUOUS
Status: ACTIVE | OUTPATIENT
Start: 2019-02-26 | End: 2019-02-27

## 2019-02-26 RX ORDER — OXYTOCIN/RINGER'S LACTATE 20/1000 ML
2 PLASTIC BAG, INJECTION (ML) INTRAVENOUS CONTINUOUS
Status: DISCONTINUED | OUTPATIENT
Start: 2019-02-26 | End: 2019-02-26

## 2019-02-26 RX ORDER — DIPHENHYDRAMINE HCL 25 MG
25 CAPSULE ORAL EVERY 4 HOURS PRN
Status: DISCONTINUED | OUTPATIENT
Start: 2019-02-26 | End: 2019-02-28 | Stop reason: HOSPADM

## 2019-02-26 RX ORDER — BUPIVACAINE HYDROCHLORIDE 2.5 MG/ML
INJECTION, SOLUTION INFILTRATION; PERINEURAL
Status: COMPLETED | OUTPATIENT
Start: 2019-02-26 | End: 2019-02-26

## 2019-02-26 RX ORDER — ONDANSETRON 8 MG/1
8 TABLET, ORALLY DISINTEGRATING ORAL EVERY 8 HOURS PRN
Status: DISCONTINUED | OUTPATIENT
Start: 2019-02-26 | End: 2019-02-28 | Stop reason: HOSPADM

## 2019-02-26 RX ORDER — OXYTOCIN/RINGER'S LACTATE 20/1000 ML
333 PLASTIC BAG, INJECTION (ML) INTRAVENOUS CONTINUOUS
Status: DISCONTINUED | OUTPATIENT
Start: 2019-02-26 | End: 2019-02-26

## 2019-02-26 RX ORDER — FENTANYL/BUPIVACAINE/NS/PF 2MCG/ML-.1
PLASTIC BAG, INJECTION (ML) INJECTION
Status: DISCONTINUED | OUTPATIENT
Start: 2019-02-26 | End: 2019-02-26

## 2019-02-26 RX ORDER — SODIUM CHLORIDE 9 MG/ML
INJECTION, SOLUTION INTRAVENOUS
Status: DISCONTINUED | OUTPATIENT
Start: 2019-02-26 | End: 2019-02-26

## 2019-02-26 RX ORDER — METRONIDAZOLE 500 MG/1
2 TABLET ORAL ONCE
Status: COMPLETED | OUTPATIENT
Start: 2019-02-27 | End: 2019-02-27

## 2019-02-26 RX ORDER — DOCUSATE SODIUM 100 MG/1
200 CAPSULE, LIQUID FILLED ORAL 2 TIMES DAILY PRN
Status: DISCONTINUED | OUTPATIENT
Start: 2019-02-26 | End: 2019-02-26

## 2019-02-26 RX ORDER — METHYLERGONOVINE MALEATE 0.2 MG/ML
200 INJECTION INTRAVENOUS
Status: DISCONTINUED | OUTPATIENT
Start: 2019-02-26 | End: 2019-02-26

## 2019-02-26 RX ADMIN — OXYCODONE AND ACETAMINOPHEN 1 TABLET: 5; 325 TABLET ORAL at 09:02

## 2019-02-26 RX ADMIN — FERROUS SULFATE TAB EC 325 MG (65 MG FE EQUIVALENT) 325 MG: 325 (65 FE) TABLET DELAYED RESPONSE at 09:02

## 2019-02-26 RX ADMIN — Medication 2 MILLI-UNITS/MIN: at 06:02

## 2019-02-26 RX ADMIN — Medication 41.65 MILLI-UNITS/MIN: at 04:02

## 2019-02-26 RX ADMIN — Medication 10 ML: at 12:02

## 2019-02-26 RX ADMIN — IBUPROFEN 600 MG: 600 TABLET ORAL at 10:02

## 2019-02-26 RX ADMIN — SODIUM CHLORIDE, SODIUM LACTATE, POTASSIUM CHLORIDE, AND CALCIUM CHLORIDE: .6; .31; .03; .02 INJECTION, SOLUTION INTRAVENOUS at 05:02

## 2019-02-26 RX ADMIN — BUPIVACAINE HYDROCHLORIDE 8 ML: 2.5 INJECTION, SOLUTION INFILTRATION; PERINEURAL at 12:02

## 2019-02-26 NOTE — H&P
[facility]  Obstetrics  History & Physical    Patient Name: Anila Pendleton  MRN: [id number]  Admission Date: 2019  Primary Care Provider: Primary Doctor No    Subjective:     Principal Problem:<principal problem not specified>    History of Present Illness:   Anila Pendleton is a 28 y.o.  female with IUP at 39w0d gestation who presents today for elective induction 2/2 constant pelvic pain. Patient reports occasional contractions. She has also s/o vaginal discharge for the past several weeks.     This IUP is complicated by HSIL pap smear followed by CINII biopsy-proven cervical dysplasia during pregnancy, Abnormal 1 hour glucose screen with a normal 3 hour glucose tolerance test, late prenatal care, Trichomonas this pregnancy, and anemia.  Patient reports contractions, denies vaginal bleeding, denies LOF.   Fetal Movement: normal.       Obstetric History       T3      L3     SAB0   TAB0   Ectopic0   Multiple0   Live Births1       # Outcome Date GA Lbr Jules/2nd Weight Sex Delivery Anes PTL Lv   4 Current            3 Term 02/15/15 40w0d   F Vag-Spont  N    2 Term 12 40w0d   F Vag-Spont  N JULY   1 Term 11/01/10 37w0d   M Vag-Spont           Past Medical History:   Diagnosis Date    Anemia      Past Surgical History:   Procedure Laterality Date    COLPOSCOPY         PTA Medications   Medication Sig    ferrous sulfate 325 (65 FE) MG EC tablet Take 1 tablet (325 mg total) by mouth 2 (two) times daily.    PNV,calcium 72-iron-folic acid (PRENATAL VITAMIN PLUS LOW IRON) 27 mg iron- 1 mg Tab Take 1 tablet (1 each total) by mouth once daily.       Review of patient's allergies indicates:  No Known Allergies     Family History     Problem Relation (Age of Onset)    Cancer Maternal Grandmother    Diabetes Maternal Uncle        Tobacco Use    Smoking status: Never Smoker    Smokeless tobacco: Never Used   Substance and Sexual Activity    Alcohol use: No     Frequency:  Never    Drug use: No    Sexual activity: Yes     Partners: Male     Birth control/protection: None     Review of Systems   Constitutional: Negative for chills and fever.   Respiratory: Negative for cough and wheezing.    Cardiovascular: Negative for chest pain and palpitations.   Gastrointestinal: Negative for abdominal pain, nausea and vomiting.   Genitourinary: Positive for pelvic pain and vaginal discharge. Negative for dysuria, frequency, hematuria, vaginal bleeding and vaginal pain.      Objective:     Vital Signs (Most Recent):  Temp: 98.2 °F (36.8 °C) (02/26/19 0531)  Pulse: 75 (02/26/19 1100)  Resp: 18 (02/26/19 0531)  BP: 118/73 (02/26/19 1100)  SpO2: 100 % (02/26/19 1100) Vital Signs (24h Range):  Temp:  [98.2 °F (36.8 °C)] 98.2 °F (36.8 °C)  Pulse:  [] 75  Resp:  [18] 18  SpO2:  [99 %-100 %] 100 %  BP: (101-118)/(67-75) 118/73     Weight: 91.8 kg (202 lb 6.1 oz)  Body mass index is 38.24 kg/m².    FHT: Cat 1 (reassuring)  TOCO: Q 5-7 minutes    Physical Exam:   Constitutional: She is oriented to person, place, and time. She appears well-developed and well-nourished.       Cardiovascular: Normal rate.     Pulmonary/Chest: Effort normal. No respiratory distress.        Abdominal: Soft. There is no tenderness.     Genitourinary: Vaginal discharge found.   Genitourinary Comments: Yellow-green vaginal discharge.            Musculoskeletal: Normal range of motion.       Neurological: She is alert and oriented to person, place, and time.    Skin: Skin is warm and dry.    Psychiatric: She has a normal mood and affect.       Cervix:  Dilation:  5  Effacement:  60  Station: -3  Presentation: Vertex     Significant Labs:  Lab Results   Component Value Date    GROUPTRH O POS 02/26/2019    HEPBSAG Negative 09/20/2018    STREPBCULT No Group B Streptococcus isolated 01/31/2019       CBC:   Recent Labs   Lab 02/26/19  0609   WBC 7.18   RBC 4.61   HGB 11.0*   HCT 36.2*      MCV 79*   MCH 23.9*   MCHC 30.4*      I have personallly reviewed all pertinent lab results from the last 24 hours.    Assessment/Plan:     28 y.o. female  at 39w0d for:    Encounter for elective induction of labor    - Consents signed and to chart  - Admit to Labor and Delivery unit  - Plan for induction is pitocin and AROM  - Epidural per Anesthesia  - Draw CBC, T&S Stat  - Notify Staff  - Ultrasound performed, fetus in cephalic position.   - Recheck in 2 hrs or PRN  - Post-Partum Hemorrhage risk - low       Trichomonal infection    - Treat while in hospital to ensure patient is treated         Shiva Arenas MD  Obstetrics  [facility]

## 2019-02-26 NOTE — PROGRESS NOTES
"LABOR NOTE    S:  Complaints: No.  Epidural working:  Yes; recently placed      O: BP (!) 103/54   Pulse 71   Temp 98.2 °F (36.8 °C)   Resp 18   Ht 5' 1" (1.549 m)   Wt 91.8 kg (202 lb 6.1 oz)   LMP 2018   SpO2 (!) 94%   Breastfeeding? No   BMI 38.24 kg/m²       FHT: Cat 1 (reassuring) 145bpm, moderate BTBV, + accelerations  CTX: q 3 minutes  SVE: /-2      ASSESSMENT:   28 y.o.  at 39w0d, induction of labor    FHT reassuring    Active Hospital Problems    Diagnosis  POA    Encounter for elective induction of labor [Z34.90]  Not Applicable    Trichomonal infection [A59.9]  Yes      Resolved Hospital Problems   No resolved problems to display.         PLAN:    1. Induction of labor  Continue Close Maternal/Fetal Monitoring  Pitocin Augmentation per protocol  Recheck 2 hours or PRN      Shiva Can MD      "

## 2019-02-26 NOTE — L&D DELIVERY NOTE
[facility]  Vaginal Delivery   Operative Note    SUMMARY     Normal spontaneous vaginal delivery of live infant, skin to skin was unable to be performed due to thick meconium amniotic fluid.  Infant delivered position LOT over intact perineum.  Nuchal cord: Yes, cord reduced at perineum.     This delivery was complicated by a shoulder dystocia  · After the head delivered, the right shoulder was noted to be anterior and held behind the pubic symphysis.  · Immediately at the identification of dystocia, I asked the nurse to note the time and call assistance to the room including additional residents, nursing staff and peditrics  ·  There was enough room for manuevers and an episiotomy was not required.  · Maneuvers Required to relieve the dystocia included: Floresita, Suprapubic pressure then delivery of the posterior arm  · Cord gases were sent  · Pediatricians/ NICU were in room at delivery  · APGARS were  8 at 1 minute and 9 at 5 minutes  · Birthweight 3410 grams  ·  was moving both arms without evidence of nerve injury at the time of delivery  · There was not evidence of clavicular fracture  · The Shoulder dystocia lasted a total of 20 seconds      Spontaneous delivery of placenta and IV pitocin given noting good uterine tone.  2nd degree laceration noted and repaired in normal fashion with 2-0 Vicryl along with bilateral periurethral lacerations repaired with 3-0 vicryl.  Patient tolerated delivery well. Sponge needle and lap counted correctly x2.    Indications:  (spontaneous vaginal delivery)  Pregnancy complicated by:   Patient Active Problem List   Diagnosis    HSIL (high grade squamous intraepithelial lesion) on Pap smear of cervix    Dysplasia of cervix, high grade HELENE 2    Trichomonal infection     (spontaneous vaginal delivery)    Shoulder dystocia, delivered     Admitting GA: 39w0d    Delivery Information for  Girl Anila Pendleton    Birth information:  Date of birth:  2019   Time of birth: 2:02 PM   Sex: female   Head Delivery Date/Time: 2019  2:02 PM   Delivery type: Vaginal, Spontaneous   Gestational Age: 39w0d    Delivery Providers    Delivering clinician:  Shiva Arenas MD   Provider Role    Sami Bains, CST     CARLOS ENRIQUE Reddy, CARLOS ENRIQUE Au, ALESSIO Ca RN             Measurements    Weight:  3410 g  Length:           Apgars    Living status:  Living  Apgars:   1 min.:   5 min.:   10 min.:   15 min.:   20 min.:     Skin color:   0  1       Heart rate:   2  2       Reflex irritability:   2  2       Muscle tone:   2  2       Respiratory effort:   2  2       Total:   8  9              Operative Delivery    Forceps attempted?:  No  Vacuum extractor attempted?:  No         Shoulder Dystocia    Shoulder dystocia present?:  Yes  Anterior shoulder:  left  Time recognized:  2019 14:01:00  Help called by:  at bedside   Physician/Provider:  at bedside [facility]   NICU staff:  alessio Do. Kay Ca rn   Gentle attempt at traction, assisted by maternal expulsive forces?:  Yes   First maneuver:  Floresita maneuver, suprapubic pressure  Time performed:  2019 14:01:00           Presentation    Presentation:  Vertex  Position:  Left Occiput Transverse           Interventions/Resuscitation    Method:  Bulb Suctioning, Tactile Stimulation, Deep Suctioning       Cord    Vessels:  3 vessels  Complications:  Nuchal  Nuchal Intervention:  reduced  Nuchal Cord Description:  loose nuchal cord  Number of Loops:  1  Delayed Cord Clamping?:  No  Cord Clamped Date/Time:  2019  2:05 PM  Gases Sent?:  No  Stem Cell Collection (by MD):  No       Placenta    Placenta delivery date/time:  2019 1412  Placenta removal:  Spontaneous  Placenta appearance:  Intact  Placenta disposition:  discarded           Labor Events:       labor: No     Labor Onset Date/Time:         Dilation Complete Date/Time:         Start  Pushing Date/Time:       Rupture Date/Time:              Rupture type:           Fluid Amount:        Fluid Color:        Fluid Odor:        Membrane Status (PeriCalm): ARM (Artificial Rupture)      Rupture Date/Time (PeriCalm): 2019 08:35:00      Fluid Amount (PeriCalm): Small      Fluid Color (PeriCalm): Meconium Thick       steroids: None     Antibiotics given for GBS: No     Induction: amniotomy;oxytocin     Indications for induction:        Augmentation:       Indications for augmentation:       Labor complications: Shoulder Dystocia     Additional complications:          Cervical ripening:                     Delivery:      Episiotomy: Median     Indication for Episiotomy:       Perineal Lacerations: 2nd Repaired:  Yes   Periurethral Laceration: left Repaired: No   Labial Laceration: none Repaired:     Sulcus Laceration: none Repaired:     Vaginal Laceration: No Repaired:     Cervical Laceration: No Repaired:     Repair suture:       Repair # of packets:       Vaginal delivery QBL (mL): 388      QBL (mL): 0     Combined Blood Loss (mL): 388     Vaginal Sweep Performed: Yes     Surgicount Correct: Yes       Other providers:       Anesthesia    Method:  Epidural          Details (if applicable):  Trial of Labor      Categorization:      Priority:     Indications for :     Incision Type:       Additional  information:  Forceps:    Vacuum:    Breech:    Observed anomalies    Other (Comments):           Shiva Can MD

## 2019-02-26 NOTE — SUBJECTIVE & OBJECTIVE
Obstetric History       T3      L3     SAB0   TAB0   Ectopic0   Multiple0   Live Births1       # Outcome Date GA Lbr Jules/2nd Weight Sex Delivery Anes PTL Lv   4 Current            3 Term 02/15/15 40w0d   F Vag-Spont  N    2 Term 12 40w0d   F Vag-Spont  N JULY   1 Term 11/01/10 37w0d   M Vag-Spont           Past Medical History:   Diagnosis Date    Anemia      Past Surgical History:   Procedure Laterality Date    COLPOSCOPY         PTA Medications   Medication Sig    ferrous sulfate 325 (65 FE) MG EC tablet Take 1 tablet (325 mg total) by mouth 2 (two) times daily.    PNV,calcium 72-iron-folic acid (PRENATAL VITAMIN PLUS LOW IRON) 27 mg iron- 1 mg Tab Take 1 tablet (1 each total) by mouth once daily.       Review of patient's allergies indicates:  No Known Allergies     Family History     Problem Relation (Age of Onset)    Cancer Maternal Grandmother    Diabetes Maternal Uncle        Tobacco Use    Smoking status: Never Smoker    Smokeless tobacco: Never Used   Substance and Sexual Activity    Alcohol use: No     Frequency: Never    Drug use: No    Sexual activity: Yes     Partners: Male     Birth control/protection: None     Review of Systems   Constitutional: Negative for chills and fever.   Respiratory: Negative for cough and wheezing.    Cardiovascular: Negative for chest pain and palpitations.   Gastrointestinal: Negative for abdominal pain, nausea and vomiting.   Genitourinary: Positive for pelvic pain and vaginal discharge. Negative for dysuria, frequency, hematuria, vaginal bleeding and vaginal pain.      Objective:     Vital Signs (Most Recent):  Temp: 98.2 °F (36.8 °C) (19 0531)  Pulse: 75 (19 1100)  Resp: 18 (19 0531)  BP: 118/73 (19 1100)  SpO2: 100 % (19 1100) Vital Signs (24h Range):  Temp:  [98.2 °F (36.8 °C)] 98.2 °F (36.8 °C)  Pulse:  [] 75  Resp:  [18] 18  SpO2:  [99 %-100 %] 100 %  BP: (101-118)/(67-75) 118/73     Weight: 91.8 kg  (202 lb 6.1 oz)  Body mass index is 38.24 kg/m².    FHT: Cat 1 (reassuring)  TOCO: Q 5-7 minutes    Physical Exam:   Constitutional: She is oriented to person, place, and time. She appears well-developed and well-nourished.       Cardiovascular: Normal rate.     Pulmonary/Chest: Effort normal. No respiratory distress.        Abdominal: Soft. There is no tenderness.     Genitourinary: Vaginal discharge found.   Genitourinary Comments: Yellow-green vaginal discharge.            Musculoskeletal: Normal range of motion.       Neurological: She is alert and oriented to person, place, and time.    Skin: Skin is warm and dry.    Psychiatric: She has a normal mood and affect.       Cervix:  Dilation:  5  Effacement:  60  Station: -3  Presentation: Vertex     Significant Labs:  Lab Results   Component Value Date    GROUPTRH O POS 02/26/2019    HEPBSAG Negative 09/20/2018    STREPBCULT No Group B Streptococcus isolated 01/31/2019       CBC:   Recent Labs   Lab 02/26/19  0609   WBC 7.18   RBC 4.61   HGB 11.0*   HCT 36.2*      MCV 79*   MCH 23.9*   MCHC 30.4*     I have personallly reviewed all pertinent lab results from the last 24 hours.

## 2019-02-26 NOTE — ANESTHESIA PREPROCEDURE EVALUATION
02/26/2019  Anila Pendleton is a 28 y.o., female.    Anesthesia Evaluation    I have reviewed the Patient Summary Reports.    I have reviewed the Nursing Notes.      Review of Systems  Anesthesia Hx:  No problems with previous Anesthesia  History of prior surgery of interest to airway management or planning: Previous anesthesia: Epidural Denies Family Hx of Anesthesia complications.   Denies Personal Hx of Anesthesia complications.   Social:  Non-Smoker, No Alcohol Use    Hematology/Oncology:         -- Anemia: Hematology Comments: No bleeding disorder   Cardiovascular:  Cardiovascular Normal Exercise tolerance: good  Denies Dysrhythmias.     Pulmonary:  Pulmonary Normal    Renal/:  Renal/ Normal     Hepatic/GI:  Hepatic/GI Normal    Neurological:  Neurology Normal    Endocrine:  Endocrine Normal        Physical Exam  General:  Well nourished    Airway/Jaw/Neck:  Airway Findings: Mouth Opening: Normal Tongue: Normal  Mallampati: II  TM Distance: 4 - 6 cm  Jaw/Neck Findings:  Neck ROM: Normal ROM     Eyes/Ears/Nose:  Eyes/Ears/Nose Findings:    Dental:  Dental Findings: In tact   Chest/Lungs:  Chest/Lungs Findings: Normal Respiratory Rate     Heart/Vascular:  Heart Findings: Rate: Normal  Rhythm: Regular Rhythm        Mental Status:  Mental Status Findings:  Cooperative, Alert and Oriented       Wt Readings from Last 3 Encounters:   02/26/19 91.8 kg (202 lb 6.1 oz)   02/22/19 91.8 kg (202 lb 6.1 oz)   02/14/19 91 kg (200 lb 11.7 oz)     Temp Readings from Last 3 Encounters:   02/26/19 36.8 °C (98.2 °F)   10/31/18 36.5 °C (97.7 °F) (Oral)   10/20/18 36.7 °C (98.1 °F) (Oral)     BP Readings from Last 3 Encounters:   02/26/19 114/64   02/22/19 104/68   02/14/19 102/64     Pulse Readings from Last 3 Encounters:   02/26/19 105   01/31/19 101   10/31/18 89     Lab Results   Component Value Date    WBC 7.18  02/26/2019    HGB 11.0 (L) 02/26/2019    HCT 36.2 (L) 02/26/2019    MCV 79 (L) 02/26/2019     02/26/2019         Anesthesia Plan  Type of Anesthesia, risks & benefits discussed:  Anesthesia Type:  epidural  Patient's Preference:   Intra-op Monitoring Plan:   Intra-op Monitoring Plan Comments:   Post Op Pain Control Plan: epidural analgesia  Post Op Pain Control Plan Comments:   Induction:    Beta Blocker:  Patient is not currently on a Beta-Blocker (No further documentation required).       Informed Consent: Patient understands risks and agrees with Anesthesia plan.  Questions answered. Anesthesia consent signed with patient.  ASA Score: 2     Day of Surgery Review of History & Physical: I have interviewed and examined the patient. I have reviewed the patient's H&P dated:            Ready For Surgery From Anesthesia Perspective.

## 2019-02-26 NOTE — HPI
Anila Pendleton is a 28 y.o.  female with IUP at 39w0d gestation who presents today for elective induction 2/2 constant pelvic pain. Patient reports occasional contractions. She has also s/o vaginal discharge for the past several weeks.     This IUP is complicated by HSIL pap smear followed by CINII biopsy-proven cervical dysplasia during pregnancy, Abnormal 1 hour glucose screen with a normal 3 hour glucose tolerance test, late prenatal care, Trichomonas this pregnancy, and anemia.  Patient reports contractions, denies vaginal bleeding, denies LOF.   Fetal Movement: normal.

## 2019-02-26 NOTE — HOSPITAL COURSE
2019: PPD#1 s/p  complicated by shoulder dytocia. Patient ambulating, voiding, and tolerating PO. She is breast feeding and reports normal lochia. Pain managed with oral pain medication  2019: PPD#2 s/p  complicated by shoulder dytocia. Patient ambulating, voiding, and tolerating PO. She is breast feeding.

## 2019-02-26 NOTE — ANESTHESIA PROCEDURE NOTES
Epidural    Patient location during procedure: OB   Reason for block: primary anesthetic   Diagnosis: IUP   Start time: 2/26/2019 12:06 PM  Timeout: 2/26/2019 12:05 PM  End time: 2/26/2019 12:25 PM  Staffing  Anesthesiologist: Nicolás Wilder MD  Performed: anesthesiologist   Preanesthetic Checklist  Completed: patient identified, site marked, pre-op evaluation, timeout performed, IV checked, monitors and equipment checked, anesthesia consent given, hand hygiene performed and patient being monitored  Preparation  Patient position: sitting  Prep: ChloraPrep  Patient monitoring: Pulse Ox and Blood Pressure  Epidural  Skin Anesthetic: lidocaine 1%  Skin Wheal: 3 mL  Administration type: single shot  Approach: midline  Interspace: L4-5    Injection technique: CARMEN air  Needle and Epidural Catheter  Needle type: Tuohy   Needle gauge: 17  Needle length: 3.5 inches  Needle insertion depth: 7 cm  Catheter type: springwound  Catheter size: 19 G  Catheter at skin depth: 11 cm  Test dose: 3 mL of lidocaine 1.5% with Epi 1-to-200,000  Additional Documentation: negative aspiration for heme and CSF, no paresthesia on injection, no significant complaints from patient, no significant pain on injection, no signs/symptoms of IV or SA injection and incremental injection  Needle localization: anatomical landmarks  Assessment  Upper dermatomal levels - Left: T10  Right: T10   Dermatomal levels determined by pinch or prick  Ease of block: easy  Patient's tolerance of the procedure: comfortable throughout block and no complaintsNo inadvertent dural puncture with Tuohy.  Dural puncture not performed with spinal needle

## 2019-02-26 NOTE — ASSESSMENT & PLAN NOTE
- Consents signed and to chart  - Admit to Labor and Delivery unit  - Plan for induction is pitocin and AROM  - Epidural per Anesthesia  - Draw CBC, T&S Stat  - Notify Staff  - Ultrasound performed, fetus in cephalic position.   - Recheck in 2 hrs or PRN  - Post-Partum Hemorrhage risk - low

## 2019-02-27 LAB
BASOPHILS # BLD AUTO: 0.01 K/UL
BASOPHILS NFR BLD: 0.1 %
DIFFERENTIAL METHOD: ABNORMAL
EOSINOPHIL # BLD AUTO: 0.1 K/UL
EOSINOPHIL NFR BLD: 0.5 %
ERYTHROCYTE [DISTWIDTH] IN BLOOD BY AUTOMATED COUNT: 16.1 %
HCT VFR BLD AUTO: 31.1 %
HGB BLD-MCNC: 9.5 G/DL
LYMPHOCYTES # BLD AUTO: 2 K/UL
LYMPHOCYTES NFR BLD: 20.4 %
MCH RBC QN AUTO: 24 PG
MCHC RBC AUTO-ENTMCNC: 30.5 G/DL
MCV RBC AUTO: 79 FL
MONOCYTES # BLD AUTO: 1 K/UL
MONOCYTES NFR BLD: 9.7 %
NEUTROPHILS # BLD AUTO: 6.8 K/UL
NEUTROPHILS NFR BLD: 69.3 %
PLATELET # BLD AUTO: 234 K/UL
PMV BLD AUTO: 11.7 FL
RBC # BLD AUTO: 3.96 M/UL
RPR SER QL: NORMAL
WBC # BLD AUTO: 9.86 K/UL

## 2019-02-27 PROCEDURE — 11000001 HC ACUTE MED/SURG PRIVATE ROOM

## 2019-02-27 PROCEDURE — 85025 COMPLETE CBC W/AUTO DIFF WBC: CPT

## 2019-02-27 PROCEDURE — 99233 PR SUBSEQUENT HOSPITAL CARE,LEVL III: ICD-10-PCS | Mod: ,,, | Performed by: OBSTETRICS & GYNECOLOGY

## 2019-02-27 PROCEDURE — 36415 COLL VENOUS BLD VENIPUNCTURE: CPT

## 2019-02-27 PROCEDURE — 99233 SBSQ HOSP IP/OBS HIGH 50: CPT | Mod: ,,, | Performed by: OBSTETRICS & GYNECOLOGY

## 2019-02-27 PROCEDURE — 25000003 PHARM REV CODE 250: Performed by: OBSTETRICS & GYNECOLOGY

## 2019-02-27 RX ADMIN — IBUPROFEN 600 MG: 600 TABLET ORAL at 08:02

## 2019-02-27 RX ADMIN — FERROUS SULFATE TAB EC 325 MG (65 MG FE EQUIVALENT) 325 MG: 325 (65 FE) TABLET DELAYED RESPONSE at 08:02

## 2019-02-27 RX ADMIN — FERROUS SULFATE TAB EC 325 MG (65 MG FE EQUIVALENT) 325 MG: 325 (65 FE) TABLET DELAYED RESPONSE at 09:02

## 2019-02-27 RX ADMIN — METRONIDAZOLE 2 G: 500 TABLET ORAL at 09:02

## 2019-02-27 RX ADMIN — IBUPROFEN 600 MG: 600 TABLET ORAL at 10:02

## 2019-02-27 RX ADMIN — OXYCODONE AND ACETAMINOPHEN 1 TABLET: 5; 325 TABLET ORAL at 08:02

## 2019-02-27 NOTE — NURSING
In bed still rates pain 5/10 to lower abd, medicated with prn pain pill as ordered elyssa well  Distress noted call light in reach refused bed alarm to be set will continue to monitor sig other at bedside.

## 2019-02-27 NOTE — PROGRESS NOTES
[facility]  Obstetrics  Postpartum Progress Note    Patient Name: Anila Pendleton  MRN: [id number]  Admission Date: 2019  Hospital Length of Stay: 1 days  Attending Physician: Shiva Arenas,*  Primary Care Provider: Primary Doctor No    Subjective:     Principal Problem: (spontaneous vaginal delivery)    Hospital course: 2019: PPD#1 s/p  complicated by shoulder dytocia. Patient ambulating, voiding, and tolerating PO. She is breast feeding and reports normal lochia. Pain managed with oral pain medication    Interval History: She is doing well this morning. She is tolerating a regular diet without nausea or vomiting. She is voiding spontaneously. She is ambulating. She has passed flatus, and has not a BM. Vaginal bleeding is moderate. She denies fever or chills. Abdominal pain is mild and controlled with oral medications. She is breastfeeding. She desires circumcision for her male baby: not applicable.    Objective:     Vital Signs (Most Recent):  Temp: 96.2 °F (35.7 °C) (19 1127)  Pulse: 72 (19 1127)  Resp: 18 (19 1127)  BP: 102/65 (19 1127)  SpO2: 95 % (19 1127) Vital Signs (24h Range):  Temp:  [96.1 °F (35.6 °C)-98.6 °F (37 °C)] 96.2 °F (35.7 °C)  Pulse:  [] 72  Resp:  [18-24] 18  SpO2:  [94 %-100 %] 95 %  BP: ()/(52-83) 102/65     Weight: 91.8 kg (202 lb 6.1 oz)  Body mass index is 38.24 kg/m².      Intake/Output Summary (Last 24 hours) at 2019 1158  Last data filed at 2019 0626  Gross per 24 hour   Intake 1080 ml   Output 2888 ml   Net -1808 ml       Significant Labs:  Lab Results   Component Value Date    GROUPTRH O POS 2019    HEPBSAG Negative 2018    STREPBCULT No Group B Streptococcus isolated 2019     Recent Labs   Lab 19  0738   HGB 9.5*   HCT 31.1*       CBC:   Recent Labs   Lab 19  0738   WBC 9.86   RBC 3.96*   HGB 9.5*   HCT 31.1*      MCV 79*   MCH 24.0*   MCHC 30.5*      I have personallly reviewed all pertinent lab results from the last 24 hours.    Physical Exam:   Constitutional: She is oriented to person, place, and time. She appears well-developed and well-nourished.       Cardiovascular: Normal rate.     Pulmonary/Chest: Effort normal. No respiratory distress.        Abdominal: Soft.   Uterus just above umbilicus but firm                 Neurological: She is alert and oriented to person, place, and time.    Skin: Skin is warm and dry.    Psychiatric: She has a normal mood and affect.       Assessment/Plan:     28 y.o. female  for:    *  (spontaneous vaginal delivery)    Postpartum care:  - Patient doing well. Continue routine management and advances.  - Continue PO pain meds. Pain well controlled.  - Encourage ambulation.   - Heme: Post Delivery h/h 9.   - Contraception - Depo Provera  - Lactation - The patient is breast feeding. Lactation nurse following along PRN  - Rh Status - OPOS       Trichomonal infection    - Metronidazole 2g given postpartum         Disposition: As patient meets milestones, will plan to discharge PPD#2.    Shiva Arenas MD  Obstetrics  [facility] [place]

## 2019-02-27 NOTE — LACTATION NOTE
02/27/19 0915   Maternal Assessment   Breast Density soft   Areola elastic   Nipples everted   Maternal Infant Feeding   Maternal Emotional State independent   Infant Positioning cradle   Signs of Milk Transfer audible swallow   Pain with Feeding no   Latch Assistance no   Breastfeeding basics reviewed.

## 2019-02-27 NOTE — SUBJECTIVE & OBJECTIVE
Hospital course: 2019: PPD#1 s/p  complicated by shoulder dytocia. Patient ambulating, voiding, and tolerating PO. She is breast feeding and reports normal lochia. Pain managed with oral pain medication    Interval History: She is doing well this morning. She is tolerating a regular diet without nausea or vomiting. She is voiding spontaneously. She is ambulating. She has passed flatus, and has not a BM. Vaginal bleeding is moderate. She denies fever or chills. Abdominal pain is mild and controlled with oral medications. She is breastfeeding. She desires circumcision for her male baby: not applicable.    Objective:     Vital Signs (Most Recent):  Temp: 96.2 °F (35.7 °C) (19 112)  Pulse: 72 (19)  Resp: 18 (19)  BP: 102/65 (19)  SpO2: 95 % (19) Vital Signs (24h Range):  Temp:  [96.1 °F (35.6 °C)-98.6 °F (37 °C)] 96.2 °F (35.7 °C)  Pulse:  [] 72  Resp:  [18-24] 18  SpO2:  [94 %-100 %] 95 %  BP: ()/(52-83) 102/65     Weight: 91.8 kg (202 lb 6.1 oz)  Body mass index is 38.24 kg/m².      Intake/Output Summary (Last 24 hours) at 2019 1158  Last data filed at 2019 0626  Gross per 24 hour   Intake 1080 ml   Output 2888 ml   Net -1808 ml       Significant Labs:  Lab Results   Component Value Date    GROUPTRH O POS 2019    HEPBSAG Negative 2018    STREPBCULT No Group B Streptococcus isolated 2019     Recent Labs   Lab 19  0738   HGB 9.5*   HCT 31.1*       CBC:   Recent Labs   Lab 19  0738   WBC 9.86   RBC 3.96*   HGB 9.5*   HCT 31.1*      MCV 79*   MCH 24.0*   MCHC 30.5*     I have personallly reviewed all pertinent lab results from the last 24 hours.    Physical Exam:   Constitutional: She is oriented to person, place, and time. She appears well-developed and well-nourished.       Cardiovascular: Normal rate.     Pulmonary/Chest: Effort normal. No respiratory distress.        Abdominal: Soft.   Uterus just  above umbilicus but firm                 Neurological: She is alert and oriented to person, place, and time.    Skin: Skin is warm and dry.    Psychiatric: She has a normal mood and affect.

## 2019-02-27 NOTE — PLAN OF CARE
Problem: Adult Inpatient Plan of Care  Goal: Plan of Care Review  Outcome: Ongoing (interventions implemented as appropriate)  Breastfeed according to infant cues at least 8 times in 24 hours.

## 2019-02-27 NOTE — NURSING
In bed resting quietly respirations are nonlabored. No distress noted will report and round with oncoming nurse

## 2019-02-27 NOTE — NURSING
Pt in bed still has c/o cramping 6/10 to abd medicated with prn motrin as ordered elyssa well. No distress noted, nursing baby. No distress noted call light in reach refuse bed alarm to be set. Will continue to monitor sig other at bedside

## 2019-02-27 NOTE — NURSING
Standby assist given to pt to bathroom elyssa well slow steady gait noted voided 900ml of blood tinged urine. Back in bed rates pain 2/10 to abd  Refusing bed alarm, to be set. No distress noted call light in reach will continue to monitor sig other and baby at bedside

## 2019-02-27 NOTE — ASSESSMENT & PLAN NOTE
Postpartum care:  - Patient doing well. Continue routine management and advances.  - Continue PO pain meds. Pain well controlled.  - Encourage ambulation.   - Heme: Post Delivery h/h 9.5/31   - Contraception - Depo Provera  - Lactation - The patient is breast feeding. Lactation nurse following along PRN  - Rh Status - OPOS

## 2019-02-27 NOTE — NURSING
Telephone report received from [place] of labor and delivery. Pt arrived to unit via wheelchair. Stable. Vital signs are stable pt is aaox4. Oriented to unit  Call light and room. Verbalized understanding has no valuables to be locked up in security. Standby assist given to pt to bathroom slow steady gait noted. Voided 900ml of blood tinged urine. Mod amt of blood noted to existing pad. Pad changed. Back in bed refused bed alarm to be set. Reports pain 5/10 to lower abd area. Will medicate with pain med as ordered. Stated still felt slight amt of numbness to ble. Denies nausea. Nonpitting edema noted to bilat feet. Fundus firm and at umbillicus. Clear breath sounds upon auscultation no respiratory distress noted pulses palpable active bowel sounds x4 quads. Baby at bedside. Baby's nurse yolanda at bedside as well. No distress noted call light in reach will continue to ,monitor sig other at bedside

## 2019-02-27 NOTE — LACTATION NOTE
"This note was copied from a baby's chart.  Infant sleeping in crib at mothers bedside. Mother states  That she will nurse "later".  "

## 2019-02-27 NOTE — NURSING
In bed awake has no c/o pain at this time. No distress noted refused bed alarm to be set will continue to monitor. Sig other at bedside.

## 2019-02-27 NOTE — PLAN OF CARE
Problem: Pain Acute  Goal: Optimal Pain Control  Outcome: Ongoing (interventions implemented as appropriate)  Pain meds adm as ordered. Frequent rounds made

## 2019-02-28 VITALS
TEMPERATURE: 96 F | DIASTOLIC BLOOD PRESSURE: 66 MMHG | HEART RATE: 80 BPM | RESPIRATION RATE: 18 BRPM | OXYGEN SATURATION: 98 % | WEIGHT: 202.38 LBS | HEIGHT: 61 IN | SYSTOLIC BLOOD PRESSURE: 110 MMHG | BODY MASS INDEX: 38.21 KG/M2

## 2019-02-28 PROCEDURE — 25000003 PHARM REV CODE 250: Performed by: OBSTETRICS & GYNECOLOGY

## 2019-02-28 PROCEDURE — 99238 PR HOSPITAL DISCHARGE DAY,<30 MIN: ICD-10-PCS | Mod: ,,, | Performed by: OBSTETRICS & GYNECOLOGY

## 2019-02-28 PROCEDURE — 63600175 PHARM REV CODE 636 W HCPCS: Performed by: OBSTETRICS & GYNECOLOGY

## 2019-02-28 PROCEDURE — 99238 HOSP IP/OBS DSCHRG MGMT 30/<: CPT | Mod: ,,, | Performed by: OBSTETRICS & GYNECOLOGY

## 2019-02-28 RX ORDER — IBUPROFEN 800 MG/1
800 TABLET ORAL EVERY 6 HOURS PRN
Qty: 40 TABLET | Refills: 0 | Status: SHIPPED | OUTPATIENT
Start: 2019-02-28 | End: 2019-03-27

## 2019-02-28 RX ADMIN — IBUPROFEN 600 MG: 600 TABLET ORAL at 08:02

## 2019-02-28 RX ADMIN — FERROUS SULFATE TAB EC 325 MG (65 MG FE EQUIVALENT) 325 MG: 325 (65 FE) TABLET DELAYED RESPONSE at 08:02

## 2019-02-28 RX ADMIN — MEDROXYPROGESTERONE ACETATE 150 MG: 150 INJECTION, SUSPENSION, EXTENDED RELEASE INTRAMUSCULAR at 08:02

## 2019-02-28 NOTE — DISCHARGE INSTRUCTIONS
After a Vaginal Birth    General Discharge Instructions    · May follow a regular diet, unless otherwise discussed with physician.    · Take showers, not baths unless otherwise discussed with physician.    · Activity as tolerated.    · No lifting or heavy exercise for 6 weeks, no driving for 2 weeks, no sexual intercourse, douching or tampons for 6 weeks    · May return to work/school as discussed with physician  · Take medications as directed    · Discuss birth control with physician    · Breast care support bra worn at all times    · Lactation consultant referral number ( [phone] or [phone])    Call Your Healthcare Provider Right Away If You Have:  · A temperature of 100.4°F or higher.  · If your blood pressure is over 155/105.  · You have difficulty catching your breath or trouble breathing.  · Heavy vaginal bleeding, clots, or vaginal discharge with foul odor. (heavier than menses)  · Persistent nausea or vomiting.  · You gain more than 3 pounds in 3 days.  · Severe headaches not relieved by Tylenol (acetaminophen) or Motrin (ibuprofen)  · Blurry or double vision, see spots or flashing lights.  · Dizziness or fainting.  · New onset swelling or worsening of existing swelling.  · Burning or pain when you urinate.  · No bowel movement for 5 days.  · Redness, warmth, swelling, or pain in the lower leg.  · Redness, discharge, or pain worse than you had in the hospital.  · Burning, pain, red streaks, or lumpy areas in your breasts.  · Cracks, blisters, or blood on your nipples.  · Feelings of extreme sadness or anxiety, or a feeling that you dont want to be with your baby.  · If you have any new or unusual symptoms or have questions or concerns    Some of these symptoms can occur up to 4 to 6 weeks after delivery. This can be a sign of pre-eclampsia, which is a serious disease that can cause stroke, seizures, organ damage, or death. Do not wait to call your doctor or seek medical attention.            If  You Had Stitches  You may have received stitches in the skin near your vagina. The stitches might have closed an episiotomy (an incision that enlarges the opening of the vagina). Or you may have needed stitches to repair torn skin. Either way, your stitches should dissolve within weeks. Until then, you can help reduce discomfort, aid healing, and reduce your risk of infection by keeping the stitches clean. These tips can help:    · Gently wipe from front to back after you urinate or have a bowel movement.  · After wiping, spray warm water on the area. Or you can have a sitz bath. This means sitting in a tub with a few inches of water in it. Then pat the area dry or use a hairdryer on a cool setting.  · You can take a shower instead of a bath.  · Change sanitary pads at least every 2-4 hours.  · Place cold or heat packs on the area as directed by your doctors or nurses. Keep a thin towel between the pack and your skin.  · Sit on firm seats so the stitches pull less.     Follow-Up  Schedule a  follow-up exam with your healthcare provider for about 6 weeks after delivery. During this exam, your uterus and vaginal area will be checked. Contact your healthcare provider if you think you or your baby are having any problems.            Breastfeeding discharge instructions given with First Alert form and reviewed.  Also discussed:   AAP recommendation of exclusive breastfeeding for the first 6 months of life and continued breastfeeding with the introduction of supplemental foods beyond the first year of life.  Instructed on the recommendation to delay all bottle and pacifier use until after 4 weeks of age and breastfeeding is well established.  Discussed the benefits of exclusive breastfeeding for both mother and baby.  Discussed the risks of supplementation/pacifier use on the exclusivity of breastfeeding in the first 6 months. Feed the baby at the earliest sign of hunger or comfort  o Hands to mouth,  sucking motions  o Rooting or searching for something to suck on  o Dont wait for crying - it is a not a late sign of hunger; it is a sign of distress     The feedings may be 8-12 times per 24hrs and will not follow a schedule   Alternate the breast you start the feeding with, or start with the breast that feels the fullest   Switch breasts when the baby takes himself off the breast or falls asleep   Keep offering breasts until the baby looks full, no longer gives hunger signs, and stays asleep when placed on his back in the crib   If the baby is sleepy and wont wake for a feeding, put the baby skin-to-skin dressed in a diaper against the mothers bare chest   Sleep near your baby   The baby should be positioned and latched on to the breast correctly  o Chest-to-chest, chin in the breast  o Babys lips are flipped outward  o Babys mouth is stretched open wide like a shout  o Babys sucking should feel like tugging to the mother  - The baby should be drinking at the breast:  o You should hear swallowing or gulping throughout the feeding  o You should see milk on the babys lips when he comes off the breast  o Your breasts should be softer when the baby is finished feeding  o The baby should look relaxed at the end of feedings  o After the 4th day and your milk is in:  o The babys poop should turn bright yellow and be loose, watery, and seedy  o The baby should have at least 3-4 poops the size of the palm of your hand per day  o The baby should have at least 6-8 wet diapers per day  o The urine should be light yellow in color  You should drink when you are thirsty and eat a healthy diet when you are    hungry.     Take naps to get the rest you need.   Take medications and/or drink alcohol only with permission of your obstetrician    or the babys pediatrician.  You can also call the [facility],   ([phone]), Monday-Friday, 8am-5pm Central time, to get the most   up-to-date evidence-based  information on the use of medications during   pregnancy and breastfeeding.      The baby should be examined by a pediatrician at 3-5 days of age; unless ordered sooner by the pediatrician.  Once your milk comes in, the baby should be back to birth weight no later than 10-14 days of age.

## 2019-02-28 NOTE — LACTATION NOTE
02/28/19 0850   Maternal Assessment   Breast Density Bilateral:;soft   Areola Bilateral:;elastic   Nipples Bilateral:;everted   Maternal Infant Feeding   Maternal Emotional State independent;relaxed   Infant Positioning clutch/football   Signs of Milk Transfer audible swallow;infant jaw motion present   Pain with Feeding no   Latch Assistance yes   mother with baby attempting at breast -baby takes a few sucks and comes off falling asleep -baby placed skin to skin and fighting with latching -mother has given pacifier earlier -reinforced risks of pacifier use -mother taught hand expression and using to assist with latch -baby moved to left side and latches easily for strong sucking and swallows noted -mother encouraged to keep baby actively sucking and swallowing -discussed jaundice and need to feed frequently -review breastfeeding discharge information and referred to breastfeeding guide for community resources -aware to monitor wet and dirty diapers over next few days all questions answered and states understanding of information

## 2019-02-28 NOTE — DISCHARGE SUMMARY
[facility]  Obstetrics  Discharge Summary      Patient Name: Anila Pendleton  MRN: [id number]  Admission Date: 2019  Hospital Length of Stay: 2 days  Discharge Date and Time:  2019 12:22 PM  Attending Physician: Shiva Arenas,*   Discharging Provider: Shiva Arenas MD  Primary Care Provider: Primary Doctor No    HPI:  Anila Pendleton is a 28 y.o.  female with IUP at 39w0d gestation who presents today for elective induction 2/2 constant pelvic pain. Patient reports occasional contractions. She has also s/o vaginal discharge for the past several weeks.     This IUP is complicated by HSIL pap smear followed by CINII biopsy-proven cervical dysplasia during pregnancy, Abnormal 1 hour glucose screen with a normal 3 hour glucose tolerance test, late prenatal care, Trichomonas this pregnancy, and anemia.  Patient reports contractions, denies vaginal bleeding, denies LOF.   Fetal Movement: normal.     * No surgery found *     Hospital Course:   2019: PPD#1 s/p  complicated by shoulder dytocia. Patient ambulating, voiding, and tolerating PO. She is breast feeding and reports normal lochia. Pain managed with oral pain medication  2019: PPD#2 s/p  complicated by shoulder dytocia. Patient ambulating, voiding, and tolerating PO. She is breast feeding.        Final Active Diagnoses:    Diagnosis Date Noted POA    PRINCIPAL PROBLEM:   (spontaneous vaginal delivery) [O80] 2019 Not Applicable    Shoulder dystocia, delivered [O66.0] 2019 Yes    Trichomonal infection [A59.9] 2019 Yes      Problems Resolved During this Admission:        Labs:   CBC   Recent Labs   Lab 19  0738   WBC 9.86   HGB 9.5*   HCT 31.1*          Feeding Method: breast    Immunizations     Date Immunization Status Dose Route/Site Given by    19 MMR Incomplete 0.5 mL Subcutaneous/Left deltoid     19 Tdap Incomplete 0.5 mL  Intramuscular/Left deltoid           Delivery:    Episiotomy: Median   Lacerations: 2nd   Repair suture:     Repair # of packets:     Blood loss (ml): 388     Birth information:  YOB: 2019   Time of birth: 2:02 PM   Sex: female   Delivery type: Vaginal, Spontaneous   Gestational Age: 39w0d    Delivery Clinician:      Other providers:       Additional  information:  Forceps:    Vacuum:    Breech:    Observed anomalies      Living?:           APGARS  One minute Five minutes Ten minutes   Skin color:         Heart rate:         Grimace:         Muscle tone:         Breathing:         Totals: 8  9        Placenta: Delivered:       appearance    Pending Diagnostic Studies:     None          Discharged Condition: good    Disposition:     Follow Up:  Follow-up Information     Shiva Arenas MD In 6 weeks.    Specialty:  Obstetrics and Gynecology  Why:  post partum  Contact information:  [address] [place] [address]  [phone]                 Patient Instructions:      Diet Adult Regular     Notify your health care provider if you experience any of the following:  temperature >100.4     Notify your health care provider if you experience any of the following:  persistent nausea and vomiting or diarrhea     Notify your health care provider if you experience any of the following:  severe uncontrolled pain     Notify your health care provider if you experience any of the following:  difficulty breathing or increased cough     Notify your health care provider if you experience any of the following:  severe persistent headache     Notify your health care provider if you experience any of the following:  worsening rash     Notify your health care provider if you experience any of the following:  persistent dizziness, light-headedness, or visual disturbances     Notify your health care provider if you experience any of the following:  increased confusion or weakness     Notify your health  care provider if you experience any of the following:     Activity as tolerated     Medications:  Current Discharge Medication List      START taking these medications    Details   ibuprofen (ADVIL,MOTRIN) 800 MG tablet Take 1 tablet (800 mg total) by mouth every 6 (six) hours as needed (cramping).  Qty: 40 tablet, Refills: 0    Associated Diagnoses:  (spontaneous vaginal delivery)         CONTINUE these medications which have NOT CHANGED    Details   ferrous sulfate 325 (65 FE) MG EC tablet Take 1 tablet (325 mg total) by mouth 2 (two) times daily.  Qty: 60 tablet, Refills: 3    Associated Diagnoses: Anemia during pregnancy in third trimester      PNV,calcium 72-iron-folic acid (PRENATAL VITAMIN PLUS LOW IRON) 27 mg iron- 1 mg Tab Take 1 tablet (1 each total) by mouth once daily.  Qty: 90 tablet, Refills: 3    Associated Diagnoses: Missed period             Shiva Arenas MD  Obstetrics  [facility] [place]

## 2019-02-28 NOTE — SUBJECTIVE & OBJECTIVE
Hospital course: 2019: PPD#1 s/p  complicated by shoulder dytocia. Patient ambulating, voiding, and tolerating PO. She is breast feeding and reports normal lochia. Pain managed with oral pain medication  2019: PPD#2 s/p  complicated by shoulder dytocia. Patient ambulating, voiding, and tolerating PO. She is breast feeding.    Interval History:   She is doing well this morning. She is tolerating a regular diet without nausea or vomiting. She is voiding spontaneously. She is ambulating. She has passed flatus, and has not a BM. Vaginal bleeding is mild. She denies fever or chills. Abdominal pain is mild and controlled with oral medications. She is breastfeeding. She desires circumcision for her male baby: not applicable.    Objective:     Vital Signs (Most Recent):  Temp: 96.2 °F (35.7 °C) (19 1128)  Pulse: 80 (19 1128)  Resp: 18 (19 1128)  BP: 110/66 (19 1128)  SpO2: 98 % (19 1925) Vital Signs (24h Range):  Temp:  [96.2 °F (35.7 °C)-98 °F (36.7 °C)] 96.2 °F (35.7 °C)  Pulse:  [64-80] 80  Resp:  [17-20] 18  SpO2:  [98 %-99 %] 98 %  BP: ()/(59-78) 110/66     Weight: 91.8 kg (202 lb 6.1 oz)  Body mass index is 38.24 kg/m².      Intake/Output Summary (Last 24 hours) at 2019 1217  Last data filed at 2019 1714  Gross per 24 hour   Intake 820 ml   Output 1220 ml   Net -400 ml       Significant Labs:  Lab Results   Component Value Date    GROUPTRH O POS 2019    HEPBSAG Negative 2018    STREPBCULT No Group B Streptococcus isolated 2019     Recent Labs   Lab 19  0738   HGB 9.5*   HCT 31.1*       CBC:   Recent Labs   Lab 19  0738   WBC 9.86   RBC 3.96*   HGB 9.5*   HCT 31.1*      MCV 79*   MCH 24.0*   MCHC 30.5*     I have personallly reviewed all pertinent lab results from the last 24 hours.    Physical Exam:   Constitutional: She is oriented to person, place, and time. She appears well-developed and well-nourished.        Cardiovascular: Normal rate.     Pulmonary/Chest: Effort normal. No respiratory distress.        Abdominal: Soft. She exhibits no distension.   Uterus below the umbilicus                 Neurological: She is alert and oriented to person, place, and time.    Skin: Skin is warm and dry.    Psychiatric: She has a normal mood and affect.

## 2019-02-28 NOTE — PLAN OF CARE
Problem: Breastfeeding  Goal: Effective Breastfeeding  Outcome: Outcome(s) achieved Date Met: 02/28/19  Plan breastfeed on demand at least 8 times in 24 hours and monitor output over next few days

## 2019-02-28 NOTE — TREATMENT PLAN
Discharge instructions given and reviewed with pt, including when to see MD for follow up appointment, prescriptions, and when to seek medical attention. All questions encouraged and answered, pt verb understanding. Pt will notify staff when ready to be escorted off unit, NAD noted at this time.

## 2019-02-28 NOTE — PROGRESS NOTES
[facility]  Obstetrics  Postpartum Progress Note    Patient Name: Anila Pendleton  MRN: [id number]  Admission Date: 2019  Hospital Length of Stay: 2 days  Attending Physician: Shiva Arenas,*  Primary Care Provider: Primary Doctor No    Subjective:     Principal Problem: (spontaneous vaginal delivery)    Hospital course: 2019: PPD#1 s/p  complicated by shoulder dytocia. Patient ambulating, voiding, and tolerating PO. She is breast feeding and reports normal lochia. Pain managed with oral pain medication  2019: PPD#2 s/p  complicated by shoulder dytocia. Patient ambulating, voiding, and tolerating PO. She is breast feeding.    Interval History:   She is doing well this morning. She is tolerating a regular diet without nausea or vomiting. She is voiding spontaneously. She is ambulating. She has passed flatus, and has not a BM. Vaginal bleeding is mild. She denies fever or chills. Abdominal pain is mild and controlled with oral medications. She is breastfeeding. She desires circumcision for her male baby: not applicable.    Objective:     Vital Signs (Most Recent):  Temp: 96.2 °F (35.7 °C) (19 1128)  Pulse: 80 (19 1128)  Resp: 18 (19 1128)  BP: 110/66 (19 1128)  SpO2: 98 % (19 1925) Vital Signs (24h Range):  Temp:  [96.2 °F (35.7 °C)-98 °F (36.7 °C)] 96.2 °F (35.7 °C)  Pulse:  [64-80] 80  Resp:  [17-20] 18  SpO2:  [98 %-99 %] 98 %  BP: ()/(59-78) 110/66     Weight: 91.8 kg (202 lb 6.1 oz)  Body mass index is 38.24 kg/m².      Intake/Output Summary (Last 24 hours) at 2019 1217  Last data filed at 2019 1714  Gross per 24 hour   Intake 820 ml   Output 1220 ml   Net -400 ml       Significant Labs:  Lab Results   Component Value Date    GROUPTRH O POS 2019    HEPBSAG Negative 2018    STREPBCULT No Group B Streptococcus isolated 2019     Recent Labs   Lab 19  0738   HGB 9.5*   HCT 31.1*       CBC:    Recent Labs   Lab 19  0738   WBC 9.86   RBC 3.96*   HGB 9.5*   HCT 31.1*      MCV 79*   MCH 24.0*   MCHC 30.5*     I have personallly reviewed all pertinent lab results from the last 24 hours.    Physical Exam:   Constitutional: She is oriented to person, place, and time. She appears well-developed and well-nourished.       Cardiovascular: Normal rate.     Pulmonary/Chest: Effort normal. No respiratory distress.        Abdominal: Soft. She exhibits no distension.   Uterus below the umbilicus                 Neurological: She is alert and oriented to person, place, and time.    Skin: Skin is warm and dry.    Psychiatric: She has a normal mood and affect.       Assessment/Plan:     28 y.o. female  for:    *  (spontaneous vaginal delivery)    Postpartum care:  - Patient doing well. Continue routine management and advances.  - Continue PO pain meds. Pain well controlled.  - Encourage ambulation.   - Heme: Post Delivery h/h    - Contraception - s/p Depo Provera  - Lactation - The patient is breast feeding. Lactation nurse following along PRN  - Rh Status - OPOS       Shoulder dystocia, delivered    - Infant doing well     Trichomonal infection    - s/p Metronidazole 2g          Disposition: As patient meets milestones, will plan to discharge today.    Shiva Arenas MD  Obstetrics  [facility] [place]

## 2019-02-28 NOTE — PLAN OF CARE
Problem: Adult Inpatient Plan of Care  Goal: Plan of Care Review  Outcome: Ongoing (interventions implemented as appropriate)  Tolerating regular diet.  Voiding and passing flatus.  Verbalizes pain control with prn pain medications.  Ambulating in room.  Verbalizes knowledge of plan of care.

## 2019-02-28 NOTE — ASSESSMENT & PLAN NOTE
Postpartum care:  - Patient doing well. Continue routine management and advances.  - Continue PO pain meds. Pain well controlled.  - Encourage ambulation.   - Heme: Post Delivery h/h 9.5/31   - Contraception - s/p Depo Provera  - Lactation - The patient is breast feeding. Lactation nurse following along PRN  - Rh Status - OPOS

## 2019-02-28 NOTE — PLAN OF CARE
Problem: Adult Inpatient Plan of Care  Goal: Plan of Care Review  Outcome: Ongoing (interventions implemented as appropriate)  VSS, breastfeeding on demand, encouraged 8 times in 24 hours, wearing supportive bra. Fundus and lochia WDL. Voiding, passing flatus, ambulating and tolerating regular diet well. Bonding well with baby. Pain being managed with motrin and percocet as needed. Stated an understanding to POC.

## 2019-03-01 NOTE — ANESTHESIA POSTPROCEDURE EVALUATION
Anesthesia Post Evaluation    Patient: Anila Pendleton    Procedure(s) Performed: * No procedures listed *    Final Anesthesia Type: epidural  Patient location during evaluation: labor & delivery  Patient participation: Yes- Able to Participate  Level of consciousness: awake and alert  Post-procedure vital signs: reviewed and stable  Pain management: adequate  Airway patency: patent  PONV status at discharge: No PONV  Anesthetic complications: no      Cardiovascular status: hemodynamically stable  Respiratory status: unassisted and spontaneous ventilation  Hydration status: euvolemic  Follow-up not needed.        Visit Vitals  LMP 06/28/2018       Pain/Lelo Score: Pain Rating Prior to Med Admin: 5 (2/28/2019  8:41 AM)  Pain Rating Post Med Admin: 2 (2/28/2019 10:00 AM)

## 2019-03-02 ENCOUNTER — TELEPHONE (OUTPATIENT)
Dept: OBSTETRICS AND GYNECOLOGY | Facility: HOSPITAL | Age: 29
End: 2019-03-02

## 2019-03-03 ENCOUNTER — TELEPHONE (OUTPATIENT)
Dept: OBSTETRICS AND GYNECOLOGY | Facility: HOSPITAL | Age: 29
End: 2019-03-03

## 2019-03-27 ENCOUNTER — POSTPARTUM VISIT (OUTPATIENT)
Dept: OBSTETRICS AND GYNECOLOGY | Facility: CLINIC | Age: 29
End: 2019-03-27
Payer: MEDICAID

## 2019-03-27 VITALS
HEIGHT: 61 IN | SYSTOLIC BLOOD PRESSURE: 106 MMHG | DIASTOLIC BLOOD PRESSURE: 80 MMHG | WEIGHT: 184 LBS | BODY MASS INDEX: 34.74 KG/M2

## 2019-03-27 DIAGNOSIS — N87.9 CERVICAL DYSPLASIA: ICD-10-CM

## 2019-03-27 PROCEDURE — 99999 PR PBB SHADOW E&M-EST. PATIENT-LVL III: ICD-10-PCS | Mod: PBBFAC,,, | Performed by: OBSTETRICS & GYNECOLOGY

## 2019-03-27 PROCEDURE — 99999 PR PBB SHADOW E&M-EST. PATIENT-LVL III: CPT | Mod: PBBFAC,,, | Performed by: OBSTETRICS & GYNECOLOGY

## 2019-03-27 PROCEDURE — 59430 PR CARE AFTER DELIVERY ONLY: ICD-10-PCS | Mod: TH,S$PBB,, | Performed by: OBSTETRICS & GYNECOLOGY

## 2019-03-27 PROCEDURE — 99213 OFFICE O/P EST LOW 20 MIN: CPT | Mod: PBBFAC | Performed by: OBSTETRICS & GYNECOLOGY

## 2019-03-27 NOTE — PATIENT INSTRUCTIONS
After a Vaginal Birth  After having a baby, your body may be very tired. It can take time to recover from a vaginal delivery. You may stay in the hospital or birth center from 1 to 4 days. In some cases, you may be able to go home the same day.    Right after the delivery  Your temperature and blood pressure will be taken until they are stable. A nurse or other healthcare provider will observe you as you rest. You may have afterbirth pains. These are cramps caused by the uterus shrinking. Sanitary pads are used to soak up the discharge of the uterine lining. To make sure that you arent bleeding too much, the pad will be checked. And the firmness of your uterus will be checked. To do this, a nurse will gently push down on your stomach. If you had anesthesia, youll be watched closely until you can feel and move your toes. If you have perineal pain (pain between the vagina and anus), an ice pack can help.   care  While still in the hospital or birth center, youll learn how to hold and feed your baby. You will also be given instructions on how to care for your baby. This includes bathing and feeding.   Preparing to go home  You may be anxious to go home as soon as possible. Before you and your baby go home, a healthcare provider will check to be sure you are healthy enough to take care of your baby and yourself. Youre ready to go home when:  · You can walk to the bathroom and use the bathroom without help.  · You can eat solid food and swallow pills (if needed).  · You have no sign of infection or other health problems, including fever.   · You have adequate pain control.  · Your bleeding isn't excessive.  · You are able to care for your  and are emotionally stable.  Before leaving the hospital or birth center, youll be given written instructions for home self-care after vaginal delivery. Be sure to follow these instructions carefully. If you have questions or concerns, talk about them now.  If you  have stitches  You may have received stitches in the skin near your vagina. The stitches might have closed an episiotomy (an incision that enlarges the opening of the vagina). Or you may have needed stitches to repair torn skin. Either way, your stitches should dissolve within weeks. Until then, you can help reduce discomfort, aid healing, and reduce your risk of infection by keeping the stitches clean. These tips can help:  · Gently wipe from front to back after you urinate or have a bowel movement.  · After wiping, spray warm water on the area. Or you can have a sitz bath. This means sitting in a tub with a few inches of water in it. Then pat the area dry or use a hairdryer on a cool setting.  · Do not use soap or any solution except water on the area.  · You can take a shower unless told not to.  · Change sanitary pads at least every 2 to 4 hours.  · Place cold or heat packs on the area as directed by your healthcare providers or nurses. Keep a thin towel between the pack and your skin.  · Sit on firm seats so the stitches pull less.   follow-up  Schedule a  follow-up exam with your healthcare provider for about 6 weeks after delivery. During this exam, your uterus and vaginal area will be checked. Contact your healthcare provider if you think you or your baby are having any problems.  When to call your healthcare provider  Call your health care provider right away if you have:  · A fever of 100.4°F (38.0°C) or higher  · Bleeding that requires a new sanitary pad after an hour, or large blood clots  · Pain in your vagina that gets worse and isn't relieved with medicine  · Swelling, discharge, or increased pain from vaginal tear or episiotomy  · Burning, pain, red streaks, or lumpy areas in your breasts that may be accompanied by flu-like symptoms  · Cracks, blisters, or blood on your nipples  · Burning or pain when you urinate  · Nausea or vomiting  · Dizziness or fainting  · Feelings of extreme  sadness or anxiety, or a feeling that you dont want to be with your baby  · Abdominal pain that isnt relieved with medicine  · Vaginal discharge that has a bad odor  · No bowel movement for 5 days  · Painful urination, orinability to control urination  · Redness, warmth, or pain in the lower leg  · Chest pain   Date Last Reviewed: 8/2/2015  © 4792-4134 [employer]. [address] [place] [address], [place] [address]. All rights reserved. This information is not intended as a substitute for professional medical care. Always follow your healthcare professional's instructions.

## 2019-03-27 NOTE — PROGRESS NOTES
History & Physical  Gynecology      SUBJECTIVE:     Chief Complaint: Postpartum Care       History of Present Illness:  Anila Pendleton is a 28 y.o. female  presents for a postpartum visit.  She is status post  4 weeks ago.  Her hospitalization was not complicated.  She is breastfeeding.  She desires Depo-Provera for contraception.  She denies postpartum depression.    Her last pap was HSIL.    Colposcopy 10/05/2018  FINAL PATHOLOGIC DIAGNOSIS  Part 1  Squamous mucosa (submitted as exocervical biopsy at 3:00):  -Minimal koilocytosis (HPV effect, HELENE-1, LSIL)  -No anjel dysplasia and no malignancy identified  -The biopsy is superficial. Endocervical glands are not identified.  Part 2  Endocervix (submitted as exocervical biopsy at 6:00):  -Focal moderate dysplasia (CIN2, HSIL) superimposed upon squamous metaplasia with prominent  koilocytosis and glandular extension  Part 3  Squamous mucosa (submitted as exocervical biopsy at 10:00):  -Koilocytosis (HPV effect, HELENE-1, LSIL)  -Prominent chronic active cervicitis with reactive epithelial changes  -No endocervical glands are identified. No anjel dysplasia and no malignancy are identified    Review of patient's allergies indicates:  No Known Allergies    Past Medical History:   Diagnosis Date    Anemia      Past Surgical History:   Procedure Laterality Date    COLPOSCOPY       OB History        4    Para   4    Term   4            AB        Living   4       SAB        TAB        Ectopic        Multiple   0    Live Births   2               Family History   Problem Relation Age of Onset    Cancer Maternal Grandmother     Diabetes Maternal Uncle      Social History     Tobacco Use    Smoking status: Never Smoker    Smokeless tobacco: Never Used   Substance Use Topics    Alcohol use: No     Frequency: Never    Drug use: No       No current outpatient medications on file.     No current facility-administered medications for this visit.       Review of Systems:  Review of Systems   Constitutional: Negative for chills and fever.   Eyes: Negative for visual disturbance.   Respiratory: Negative for cough and wheezing.    Cardiovascular: Negative for chest pain and palpitations.   Gastrointestinal: Negative for abdominal pain, constipation, diarrhea, nausea and vomiting.   Genitourinary: Positive for vaginal bleeding. Negative for dysuria, frequency, hematuria, pelvic pain, vaginal discharge and vaginal pain.   Neurological: Negative for headaches.        OBJECTIVE:     Physical Exam:  Physical Exam   Constitutional: She is oriented to person, place, and time. She appears well-developed and well-nourished. No distress.   Cardiovascular: Normal rate.   Pulmonary/Chest: Effort normal. No respiratory distress.   Abdominal: Soft. She exhibits no distension. There is no tenderness.   Genitourinary: There is bleeding in the vagina.   Genitourinary Comments: Vaginal bleeding, vaginal sutures in place and healing well   Neurological: She is alert and oriented to person, place, and time.   Skin: Skin is warm and dry.   Psychiatric: She has a normal mood and affect.   Nursing note and vitals reviewed.    ASSESSMENT:       ICD-10-CM ICD-9-CM    1. Postpartum care and examination Z39.2 V24.2    2. Cervical dysplasia N87.9 622.10      Plan:      Anila was seen today for postpartum care.    Diagnoses and all orders for this visit:    Postpartum care and examination  - Doing well  - Postpartum Depression Screen Negative  - Depo Provera for contraception  - No sexual intercourse for another 1-2 weeks    Cervical dysplasia  - Follow up in May      No orders of the defined types were placed in this encounter.      Follow up in about 2 months (around 5/27/2019) for Depo Provera/ F/U cervical dysplasia.    Counseling time: 25 minutes    Shiva Arenas